# Patient Record
Sex: MALE | Race: WHITE | Employment: OTHER | URBAN - METROPOLITAN AREA
[De-identification: names, ages, dates, MRNs, and addresses within clinical notes are randomized per-mention and may not be internally consistent; named-entity substitution may affect disease eponyms.]

---

## 2017-04-19 RX ORDER — GABAPENTIN 300 MG/1
CAPSULE ORAL
Qty: 270 CAPSULE | Refills: 0 | Status: SHIPPED | OUTPATIENT
Start: 2017-04-19 | End: 2017-11-08

## 2017-05-10 ENCOUNTER — MED REC SCAN ONLY (OUTPATIENT)
Dept: FAMILY MEDICINE CLINIC | Facility: CLINIC | Age: 67
End: 2017-05-10

## 2017-06-14 PROBLEM — E11.69 HYPERLIPIDEMIA ASSOCIATED WITH TYPE 2 DIABETES MELLITUS (HCC): Status: ACTIVE | Noted: 2017-06-14

## 2017-06-14 PROBLEM — E11.69 HYPERLIPIDEMIA ASSOCIATED WITH TYPE 2 DIABETES MELLITUS: Status: ACTIVE | Noted: 2017-06-14

## 2017-06-14 PROBLEM — E78.5 HYPERLIPIDEMIA ASSOCIATED WITH TYPE 2 DIABETES MELLITUS: Status: ACTIVE | Noted: 2017-06-14

## 2017-06-14 PROBLEM — E78.5 HYPERLIPIDEMIA ASSOCIATED WITH TYPE 2 DIABETES MELLITUS (HCC): Status: ACTIVE | Noted: 2017-06-14

## 2017-06-15 ENCOUNTER — OFFICE VISIT (OUTPATIENT)
Dept: FAMILY MEDICINE CLINIC | Facility: CLINIC | Age: 67
End: 2017-06-15

## 2017-06-15 ENCOUNTER — APPOINTMENT (OUTPATIENT)
Dept: LAB | Age: 67
End: 2017-06-15
Attending: FAMILY MEDICINE
Payer: MEDICARE

## 2017-06-15 ENCOUNTER — MED REC SCAN ONLY (OUTPATIENT)
Dept: FAMILY MEDICINE CLINIC | Facility: CLINIC | Age: 67
End: 2017-06-15

## 2017-06-15 VITALS
RESPIRATION RATE: 20 BRPM | HEIGHT: 67 IN | BODY MASS INDEX: 35.47 KG/M2 | TEMPERATURE: 99 F | DIASTOLIC BLOOD PRESSURE: 74 MMHG | HEART RATE: 80 BPM | SYSTOLIC BLOOD PRESSURE: 120 MMHG | OXYGEN SATURATION: 99 % | WEIGHT: 226 LBS

## 2017-06-15 DIAGNOSIS — Z00.00 MEDICARE ANNUAL WELLNESS VISIT, SUBSEQUENT: Primary | ICD-10-CM

## 2017-06-15 DIAGNOSIS — E11.43 WELL CONTROLLED TYPE 2 DIABETES MELLITUS WITH AUTONOMIC NEUROPATHY (HCC): ICD-10-CM

## 2017-06-15 DIAGNOSIS — Z87.39 HISTORY OF GOUT: ICD-10-CM

## 2017-06-15 DIAGNOSIS — E11.69 HYPERLIPIDEMIA ASSOCIATED WITH TYPE 2 DIABETES MELLITUS (HCC): ICD-10-CM

## 2017-06-15 DIAGNOSIS — N40.0 BENIGN PROSTATIC HYPERPLASIA WITHOUT LOWER URINARY TRACT SYMPTOMS, UNSPECIFIED MORPHOLOGY: ICD-10-CM

## 2017-06-15 DIAGNOSIS — I10 BENIGN HYPERTENSION: ICD-10-CM

## 2017-06-15 DIAGNOSIS — E78.5 HYPERLIPIDEMIA ASSOCIATED WITH TYPE 2 DIABETES MELLITUS (HCC): ICD-10-CM

## 2017-06-15 DIAGNOSIS — E66.01 SEVERE OBESITY (BMI 35.0-39.9): ICD-10-CM

## 2017-06-15 DIAGNOSIS — E11.42 DIABETIC POLYNEUROPATHY ASSOCIATED WITH TYPE 2 DIABETES MELLITUS (HCC): ICD-10-CM

## 2017-06-15 PROCEDURE — 80053 COMPREHEN METABOLIC PANEL: CPT

## 2017-06-15 PROCEDURE — 36415 COLL VENOUS BLD VENIPUNCTURE: CPT

## 2017-06-15 PROCEDURE — 82043 UR ALBUMIN QUANTITATIVE: CPT

## 2017-06-15 PROCEDURE — G0439 PPPS, SUBSEQ VISIT: HCPCS | Performed by: FAMILY MEDICINE

## 2017-06-15 PROCEDURE — 83036 HEMOGLOBIN GLYCOSYLATED A1C: CPT

## 2017-06-15 PROCEDURE — 80061 LIPID PANEL: CPT

## 2017-06-15 PROCEDURE — 84153 ASSAY OF PSA TOTAL: CPT

## 2017-06-15 PROCEDURE — 82570 ASSAY OF URINE CREATININE: CPT

## 2017-06-15 NOTE — PROGRESS NOTES
Kristina Hunt is a 78year old male who presents for a Medicare Annual Wellness visit.     HPI:     Patient Care Team: Patient Care Team:  Kolton Taylor MD as PCP - General (Family Practice)  Brittani Yip MD as Consulting Physician [facility] 06/10/2016   LDL 91 08/28/2015   LDL 93 11/01/2014       Lab Results  Component Value Date   AST 14* 06/10/2016   AST 18 08/28/2015   AST 14* 11/01/2014       Lab Results  Component Value Date   ALT 22 06/10/2016   ALT 34 08/28/2015   ALT 29 11/01/2014 or more medical conditions?: 1-Yes    Have you fallen in the last 12 months?: 0-No    Do you accidently lose urine?: 0-No    Do you have difficulty seeing?: 0-No    Do you have any difficulty walking or getting up?: 0-No    Do you have any tripping hazards previous visit. No flowsheet data found.      Fecal Occult Blood Annually OCCULT BLOOD RESULT (no units)   Date Value   06/17/2016 Negative for Occult Blood   06/17/2016 Negative for Occult Blood   06/17/2016 Negative for Occult Blood    No flowsheet data f Lisinopril              Coughing    CURRENT MEDICATIONS:     Current Outpatient Prescriptions:  Glucose Blood (ED CONTOUR TEST) In Vitro Strip As directed, once a day Disp: 100 strip Rfl: 4   GABAPENTIN 300 MG Oral Cap TAKE ONE CAPSULE BY MOUTH THRE incontinence  MUSCULOSKELETAL: denies back pain  NEURO: denies headaches  PSYCHE: denies depression or anxiety  HEMATOLOGIC: denies hx of anemia  ENDOCRINE: denies thyroid history  ALL/ASTHMA: denies hx of allergy or asthma    EXAM:   /74 mmHg  Pulse Strip 100 strip 4      Sig: As directed, once a day           Referral to Dr. Fuad Vance. Recommendations are: continue present meds, diabetic diet and exercise 3 times per week , check feet weekly recommended to the patient.   The patient indicates underst

## 2017-06-15 NOTE — PATIENT INSTRUCTIONS
Prevention Guidelines, Men Ages 72 and Older  Screening tests and vaccines are an important part of managing your health. Health counseling is essential, too. Below are guidelines for these, for men ages 72 and older.  Talk with your healthcare provider t Prostate cancer All men in this age group, talk to healthcare provider about risks and benefits of digital rectal exam (MAAME) and prostate-specific antigen (PSA) screening1 At routine exams   Syphilis Men at increased risk for infection – talk with your hea Fall prevention (exercise, vitamin D supplements) All men in this age group At routine exams   Sexually transmitted infection Men at increased risk for infection – talk with your healthcare provider At routine exams   Use of daily aspirin Men ages 39 to 78 Step 1. Relax    · Take your blood pressure at the same time every day, such as in the morning or evening, or at the time your healthcare provider recommends.   · Wait at least a half-hour after smoking, eating, drinking caffeinated beverages, or exercising © 4883-0266 The [address] [place] [address], [address] [place]. All rights reserved. This information is not intended as a substitute for professional medical care. Always follow your healthcare professional's instructions.         Hypogly · If your blood sugar is still too low, repeat the steps above and check your blood sugar again. If your blood sugar still has not returned to your target range, contact your healthcare provider or seek emergency care.   · Once your blood sugar returns to t © 4236-0178 [address], [address] [place]. All rights reserved. This information is not intended as a substitute for professional medical care. Always follow your healthcare professional's instructions.         Exercis A pedometer is a small device that keeps track of how many steps you take. You can clip it to your belt (or a strap on your arm or leg) and go about your daily routine.  \"Smartphones\" now also have apps to record your walking. At the end of the day, the amanda Physical activity is important when you have diabetes. But you need to keep an eye on your blood sugar level. Check often if you have been active for longer than usual, or if the activity was unplanned.  Make it a habit to check your blood sugar before bein · HDL (high-density lipoprotein) is known as \"good cholesterol. \" This protein shell collects excess cholesterol that LDLs have left behind on blood vessel walls.  That's why high levels of HDL cholesterol can decrease your risk of heart disease and stroke · Take medication as directed. Many people need medication to get their LDL levels to a safe level. Medication to lower cholesterol levels is effective and safe.  (But taking medication is not a substitute for exercise or watching your diet!) Your doctor ca

## 2017-06-16 ENCOUNTER — TELEPHONE (OUTPATIENT)
Dept: FAMILY MEDICINE CLINIC | Facility: CLINIC | Age: 67
End: 2017-06-16

## 2017-06-19 ENCOUNTER — TELEPHONE (OUTPATIENT)
Dept: FAMILY MEDICINE CLINIC | Facility: CLINIC | Age: 67
End: 2017-06-19

## 2017-06-19 NOTE — TELEPHONE ENCOUNTER
----- Message from Yoseph Harris MD sent at 6/16/2017  9:27 AM CDT -----  Glucose much better, PSA is going up since last year, ok to see urologist.      Dr. Trae Gong    Urology      [address] [facility] [address] [facility], Counts include 234 beds at the [facility] [address]    Phone: [phone]

## 2017-06-19 NOTE — TELEPHONE ENCOUNTER
I called pt at [phone]  and verified 2 patient identifiers: name & . I discussed results and recommendations at length with patient. Dr Linda Tsang given. All questions answered.

## 2017-06-19 NOTE — TELEPHONE ENCOUNTER
Called spouse back, [place] per LAURA, She is aware the pt can see the alternative provider this Thursday.

## 2017-07-05 ENCOUNTER — TELEPHONE (OUTPATIENT)
Dept: FAMILY MEDICINE CLINIC | Facility: CLINIC | Age: 67
End: 2017-07-05

## 2017-10-10 DIAGNOSIS — I10 BENIGN HYPERTENSION: ICD-10-CM

## 2017-10-10 RX ORDER — LOSARTAN POTASSIUM 100 MG/1
TABLET ORAL
Qty: 90 TABLET | Refills: 3 | Status: SHIPPED | OUTPATIENT
Start: 2017-10-10 | End: 2018-06-11

## 2017-10-10 RX ORDER — GABAPENTIN 300 MG/1
CAPSULE ORAL
Qty: 270 CAPSULE | Refills: 3 | Status: SHIPPED | OUTPATIENT
Start: 2017-10-10 | End: 2017-11-08 | Stop reason: ALTCHOICE

## 2017-10-10 RX ORDER — ALLOPURINOL 300 MG/1
TABLET ORAL
Qty: 90 TABLET | Refills: 3 | Status: SHIPPED | OUTPATIENT
Start: 2017-10-10 | End: 2018-06-11

## 2017-10-10 NOTE — TELEPHONE ENCOUNTER
LOV:06/15/17   LF:11/03/16        Pending Prescriptions Disp Refills    ALLOPURINOL 300 MG Oral Tab [Pharmacy Med Name: ALLOPURINOL 300MG   TAB] 90 tablet 3     Sig: TAKE ONE TABLET BY MOUTH ONCE DAILY             Please approve or deny Rx. Thank you.

## 2017-10-19 ENCOUNTER — MED REC SCAN ONLY (OUTPATIENT)
Dept: FAMILY MEDICINE CLINIC | Facility: CLINIC | Age: 67
End: 2017-10-19

## 2017-11-08 ENCOUNTER — TELEPHONE (OUTPATIENT)
Dept: FAMILY MEDICINE CLINIC | Facility: CLINIC | Age: 67
End: 2017-11-08

## 2017-11-08 ENCOUNTER — OFFICE VISIT (OUTPATIENT)
Dept: FAMILY MEDICINE CLINIC | Facility: CLINIC | Age: 67
End: 2017-11-08

## 2017-11-08 VITALS
WEIGHT: 240 LBS | HEIGHT: 67 IN | SYSTOLIC BLOOD PRESSURE: 146 MMHG | BODY MASS INDEX: 37.67 KG/M2 | TEMPERATURE: 99 F | DIASTOLIC BLOOD PRESSURE: 98 MMHG | OXYGEN SATURATION: 95 % | RESPIRATION RATE: 18 BRPM | HEART RATE: 73 BPM

## 2017-11-08 DIAGNOSIS — E11.43 WELL CONTROLLED TYPE 2 DIABETES MELLITUS WITH AUTONOMIC NEUROPATHY (HCC): Primary | ICD-10-CM

## 2017-11-08 DIAGNOSIS — E78.5 HYPERLIPIDEMIA ASSOCIATED WITH TYPE 2 DIABETES MELLITUS (HCC): ICD-10-CM

## 2017-11-08 DIAGNOSIS — E11.69 HYPERLIPIDEMIA ASSOCIATED WITH TYPE 2 DIABETES MELLITUS (HCC): ICD-10-CM

## 2017-11-08 DIAGNOSIS — I10 BENIGN HYPERTENSION: ICD-10-CM

## 2017-11-08 PROCEDURE — 99214 OFFICE O/P EST MOD 30 MIN: CPT | Performed by: FAMILY MEDICINE

## 2017-11-08 RX ORDER — AZITHROMYCIN 250 MG/1
TABLET, FILM COATED ORAL
Qty: 6 TABLET | Refills: 0 | Status: SHIPPED | OUTPATIENT
Start: 2017-11-08 | End: 2018-06-11 | Stop reason: ALTCHOICE

## 2017-11-08 RX ORDER — GABAPENTIN 300 MG/1
CAPSULE ORAL
Qty: 270 CAPSULE | Refills: 3 | Status: SHIPPED | OUTPATIENT
Start: 2017-11-08 | End: 2018-06-11

## 2017-11-08 NOTE — TELEPHONE ENCOUNTER
----- Message from Meryl Ortiz MD sent at 11/8/2017  9:53 AM CST -----  Please update pt colonoscopy with fit test.

## 2017-11-08 NOTE — PROGRESS NOTES
Patient presents with: Follow - Up    DM, HL, HTN f/u. HPI:  Isabella Adame is a 78year old male who presents for recheck of his diabetes. Patient’s FBS have been below 100. Last HgbA1c was 5.9. Last visit with ophthalmologist was last year.  Last mi ALLOPURINOL 300 MG Oral Tab TAKE ONE TABLET BY MOUTH ONCE DAILY Disp: 90 tablet Rfl: 3   Glucose Blood (ED CONTOUR NEXT TEST) In Vitro Strip Use once daily to check blood sugar.  Dx: E11.65 Disp: 100 each Rfl: 4   Blood Glucose Monitoring Suppl (ED 2+ marky. Skin: is unremarkable with no rashes. Neuro: no focal abnormalities, and reflexes coordination and gait normal and symmetric.  Bilateral barefoot skin diabetic exam is normal, visualized feet and the appearance is normal.  Bilateral monofilament

## 2018-03-08 ENCOUNTER — PATIENT OUTREACH (OUTPATIENT)
Dept: FAMILY MEDICINE CLINIC | Facility: CLINIC | Age: 68
End: 2018-03-08

## 2018-03-08 DIAGNOSIS — E11.42 DIABETIC POLYNEUROPATHY ASSOCIATED WITH TYPE 2 DIABETES MELLITUS (HCC): Primary | ICD-10-CM

## 2018-06-11 ENCOUNTER — OFFICE VISIT (OUTPATIENT)
Dept: FAMILY MEDICINE CLINIC | Facility: CLINIC | Age: 68
End: 2018-06-11

## 2018-06-11 ENCOUNTER — APPOINTMENT (OUTPATIENT)
Dept: LAB | Age: 68
End: 2018-06-11
Attending: FAMILY MEDICINE
Payer: MEDICARE

## 2018-06-11 VITALS
DIASTOLIC BLOOD PRESSURE: 80 MMHG | TEMPERATURE: 99 F | WEIGHT: 227 LBS | HEART RATE: 82 BPM | RESPIRATION RATE: 20 BRPM | HEIGHT: 67 IN | SYSTOLIC BLOOD PRESSURE: 122 MMHG | OXYGEN SATURATION: 99 % | BODY MASS INDEX: 35.63 KG/M2

## 2018-06-11 DIAGNOSIS — I10 BENIGN HYPERTENSION: ICD-10-CM

## 2018-06-11 DIAGNOSIS — N40.0 BENIGN PROSTATIC HYPERPLASIA WITHOUT LOWER URINARY TRACT SYMPTOMS: ICD-10-CM

## 2018-06-11 DIAGNOSIS — E66.01 SEVERE OBESITY (BMI 35.0-39.9): ICD-10-CM

## 2018-06-11 DIAGNOSIS — Z87.39 HISTORY OF GOUT: ICD-10-CM

## 2018-06-11 DIAGNOSIS — Z00.00 MEDICARE ANNUAL WELLNESS VISIT, SUBSEQUENT: Primary | ICD-10-CM

## 2018-06-11 DIAGNOSIS — E11.42 DIABETIC POLYNEUROPATHY ASSOCIATED WITH TYPE 2 DIABETES MELLITUS (HCC): ICD-10-CM

## 2018-06-11 DIAGNOSIS — G62.9 NEUROPATHY: ICD-10-CM

## 2018-06-11 DIAGNOSIS — R73.03 PREDIABETES: ICD-10-CM

## 2018-06-11 PROBLEM — E78.5 HYPERLIPIDEMIA ASSOCIATED WITH TYPE 2 DIABETES MELLITUS: Status: RESOLVED | Noted: 2017-06-14 | Resolved: 2018-06-11

## 2018-06-11 PROBLEM — E78.5 HYPERLIPIDEMIA ASSOCIATED WITH TYPE 2 DIABETES MELLITUS (HCC): Status: RESOLVED | Noted: 2017-06-14 | Resolved: 2018-06-11

## 2018-06-11 PROBLEM — E11.69 HYPERLIPIDEMIA ASSOCIATED WITH TYPE 2 DIABETES MELLITUS: Status: RESOLVED | Noted: 2017-06-14 | Resolved: 2018-06-11

## 2018-06-11 PROBLEM — E11.43: Status: RESOLVED | Noted: 2017-06-15 | Resolved: 2018-06-11

## 2018-06-11 PROBLEM — E11.69 HYPERLIPIDEMIA ASSOCIATED WITH TYPE 2 DIABETES MELLITUS (HCC): Status: RESOLVED | Noted: 2017-06-14 | Resolved: 2018-06-11

## 2018-06-11 PROCEDURE — 82043 UR ALBUMIN QUANTITATIVE: CPT

## 2018-06-11 PROCEDURE — 83036 HEMOGLOBIN GLYCOSYLATED A1C: CPT

## 2018-06-11 PROCEDURE — 80061 LIPID PANEL: CPT

## 2018-06-11 PROCEDURE — 80053 COMPREHEN METABOLIC PANEL: CPT

## 2018-06-11 PROCEDURE — G0439 PPPS, SUBSEQ VISIT: HCPCS | Performed by: FAMILY MEDICINE

## 2018-06-11 PROCEDURE — 36415 COLL VENOUS BLD VENIPUNCTURE: CPT

## 2018-06-11 PROCEDURE — 84153 ASSAY OF PSA TOTAL: CPT

## 2018-06-11 PROCEDURE — 82570 ASSAY OF URINE CREATININE: CPT

## 2018-06-11 RX ORDER — LOSARTAN POTASSIUM 100 MG/1
TABLET ORAL
Qty: 90 TABLET | Refills: 4 | Status: SHIPPED | OUTPATIENT
Start: 2018-06-11 | End: 2018-11-12

## 2018-06-11 RX ORDER — ALLOPURINOL 300 MG/1
TABLET ORAL
Qty: 90 TABLET | Refills: 4 | Status: SHIPPED | OUTPATIENT
Start: 2018-06-11 | End: 2018-11-12

## 2018-06-11 RX ORDER — GABAPENTIN 600 MG/1
600 TABLET ORAL 3 TIMES DAILY
Qty: 270 TABLET | Refills: 4 | Status: SHIPPED | OUTPATIENT
Start: 2018-06-11 | End: 2018-11-12

## 2018-06-11 NOTE — PROGRESS NOTES
Shelbie Portillo is a 75year old male who presents for a Medicare Annual Wellness visit.     HPI:     Patient Care Team: Patient Care Team:  Jolly Grimaldo MD as PCP - General (Family Practice)  Bijan Priest MD as Consulting Physician [facility] Lab Results  Component Value Date   TSH 1.560 11/01/2014       Lab Results  Component Value Date   BUN 18 06/15/2017   BUN 18 06/10/2016   BUN 17 08/28/2015   CREATSERUM 1.22 06/15/2017   CREATSERUM 1.16 06/10/2016   CREATSERUM 1.09 08/28/2015 (over the last two weeks)?: Not at all    Feeling down, depressed, or hopeless (over the last two weeks)?: Not at all    PHQ-2 SCORE: 0        Advance Directives     Do you have a healthcare power of ?: No    Do you have a living will?: No     Plea orders found for this or any previous visit. Update Immunization Activity if applicable    Hepatitis B No orders found for this or any previous visit. Update Immunization Activity if applicable    Tetanus No orders found for this or any previous visit.  Upd MONITOR) W/DEVICE Does not apply Kit 1 Application by Does not apply route daily.  Disp: 1 kit Rfl: 0      MEDICAL INFORMATION:   Past Medical History:   Diagnosis Date   • Essential hypertension, malignant 3/29/12   • Other and unspecified hyperlipidemia adenopathy, no bruits  CHEST: no chest tenderness  BREAST: no masses or discharge  LUNGS: clear to auscultation  CARDIO: RRR without murmur  GI: good BS's, no masses, HSM or tenderness  : two descended testicles, no masses, no hernia and no penile lesion counseling performed    SUGGESTED VACCINATIONS -pt refuses: Pneumococcal, Zoster, Tetanus     Immunization History   Administered Date(s) Administered   • Fluzone Vaccine Medicare () 11/03/2014   • Influenza 10/07/2015, 10/17/2017   • Influenza Vaccin

## 2018-06-19 ENCOUNTER — TELEPHONE (OUTPATIENT)
Dept: FAMILY MEDICINE CLINIC | Facility: CLINIC | Age: 68
End: 2018-06-19

## 2018-11-12 ENCOUNTER — OFFICE VISIT (OUTPATIENT)
Dept: FAMILY MEDICINE CLINIC | Facility: CLINIC | Age: 68
End: 2018-11-12
Payer: MEDICARE

## 2018-11-12 VITALS
HEART RATE: 76 BPM | OXYGEN SATURATION: 99 % | DIASTOLIC BLOOD PRESSURE: 80 MMHG | WEIGHT: 239 LBS | BODY MASS INDEX: 37.51 KG/M2 | HEIGHT: 67 IN | SYSTOLIC BLOOD PRESSURE: 124 MMHG | RESPIRATION RATE: 20 BRPM | TEMPERATURE: 99 F

## 2018-11-12 DIAGNOSIS — R73.03 PREDIABETES: Primary | ICD-10-CM

## 2018-11-12 DIAGNOSIS — I10 BENIGN HYPERTENSION: ICD-10-CM

## 2018-11-12 DIAGNOSIS — E78.1 HYPERTRIGLYCERIDEMIA: ICD-10-CM

## 2018-11-12 PROCEDURE — 99214 OFFICE O/P EST MOD 30 MIN: CPT | Performed by: FAMILY MEDICINE

## 2018-11-12 RX ORDER — GABAPENTIN 600 MG/1
600 TABLET ORAL 3 TIMES DAILY
Qty: 270 TABLET | Refills: 4 | Status: SHIPPED | OUTPATIENT
Start: 2018-11-12 | End: 2019-11-04

## 2018-11-12 RX ORDER — CIPROFLOXACIN AND DEXAMETHASONE 3; 1 MG/ML; MG/ML
4 SUSPENSION/ DROPS AURICULAR (OTIC) 2 TIMES DAILY
Qty: 7.5 ML | Refills: 1 | Status: SHIPPED | OUTPATIENT
Start: 2018-11-12 | End: 2018-11-13

## 2018-11-12 RX ORDER — AZITHROMYCIN 250 MG/1
TABLET, FILM COATED ORAL
Qty: 6 TABLET | Refills: 1 | Status: SHIPPED | OUTPATIENT
Start: 2018-11-12 | End: 2019-06-03 | Stop reason: ALTCHOICE

## 2018-11-12 RX ORDER — ALLOPURINOL 300 MG/1
TABLET ORAL
Qty: 90 TABLET | Refills: 4 | Status: SHIPPED | OUTPATIENT
Start: 2018-11-12 | End: 2019-11-04

## 2018-11-12 RX ORDER — LOSARTAN POTASSIUM 100 MG/1
TABLET ORAL
Qty: 90 TABLET | Refills: 4 | Status: SHIPPED | OUTPATIENT
Start: 2018-11-12 | End: 2019-07-12 | Stop reason: ALTCHOICE

## 2018-11-13 ENCOUNTER — TELEPHONE (OUTPATIENT)
Dept: FAMILY MEDICINE CLINIC | Facility: CLINIC | Age: 68
End: 2018-11-13

## 2018-11-13 RX ORDER — CIPROFLOXACIN AND DEXAMETHASONE 3; 1 MG/ML; MG/ML
4 SUSPENSION/ DROPS AURICULAR (OTIC) 2 TIMES DAILY
Qty: 7.5 ML | Refills: 1 | Status: SHIPPED | OUTPATIENT
Start: 2018-11-13 | End: 2019-06-03 | Stop reason: ALTCHOICE

## 2018-11-13 NOTE — TELEPHONE ENCOUNTER
[facility] called to clarify which directions you want for pt's Ciprodex Rx, 2 different directions were on Rx:  \"Place 4 drops into both ears 2 (two) times daily. Two drops BID to affected ear. \"

## 2018-11-13 NOTE — TELEPHONE ENCOUNTER
[address] calling to inquire which set of directions they should follow, 2 sets were sent for script for ciprofloxacin-dexamethasone (CIPRODEX) 0.3-0.1 % Otic Suspension   Please call to advise

## 2018-11-21 ENCOUNTER — TELEPHONE (OUTPATIENT)
Dept: FAMILY MEDICINE CLINIC | Facility: CLINIC | Age: 68
End: 2018-11-21

## 2018-11-21 NOTE — TELEPHONE ENCOUNTER
PEDROI from pt below. Pls advise. Hx: prediabetes. Notes recorded by Miguel Sanders MD on 6/12/2018 at 4:04 PM CDT  Good results, no DM.

## 2018-11-21 NOTE — TELEPHONE ENCOUNTER
Called the patient, spoke with Cici [place], wife (on HIPAA), the patient had his eye exam last year, he was under the impression that he was off of the diabetic list. Pt's insurance does not cover eye exams and the Pt does not want to have an eye exam at this mo

## 2019-05-10 ENCOUNTER — TELEPHONE (OUTPATIENT)
Dept: FAMILY MEDICINE CLINIC | Facility: CLINIC | Age: 69
End: 2019-05-10

## 2019-05-10 NOTE — TELEPHONE ENCOUNTER
----- Message from Meryl Ortiz MD sent at 5/7/2019  4:01 PM CDT -----  Colonoscopy its up to date, we need to remove in  the fit test. He shows up in my healthy planet as colonoscopy not done.

## 2019-06-03 ENCOUNTER — OFFICE VISIT (OUTPATIENT)
Dept: FAMILY MEDICINE CLINIC | Facility: CLINIC | Age: 69
End: 2019-06-03
Payer: MEDICARE

## 2019-06-03 ENCOUNTER — LAB ENCOUNTER (OUTPATIENT)
Dept: LAB | Age: 69
End: 2019-06-03
Attending: FAMILY MEDICINE
Payer: MEDICARE

## 2019-06-03 VITALS
BODY MASS INDEX: 33.43 KG/M2 | RESPIRATION RATE: 22 BRPM | SYSTOLIC BLOOD PRESSURE: 126 MMHG | TEMPERATURE: 99 F | WEIGHT: 213 LBS | HEIGHT: 67 IN | HEART RATE: 78 BPM | OXYGEN SATURATION: 99 % | DIASTOLIC BLOOD PRESSURE: 80 MMHG

## 2019-06-03 DIAGNOSIS — N40.0 BENIGN PROSTATIC HYPERPLASIA WITHOUT LOWER URINARY TRACT SYMPTOMS: ICD-10-CM

## 2019-06-03 DIAGNOSIS — Z00.00 MEDICARE ANNUAL WELLNESS VISIT, SUBSEQUENT: Primary | ICD-10-CM

## 2019-06-03 DIAGNOSIS — R73.09 ELEVATED GLUCOSE: ICD-10-CM

## 2019-06-03 DIAGNOSIS — I10 BENIGN HYPERTENSION: ICD-10-CM

## 2019-06-03 DIAGNOSIS — Z12.5 SCREENING FOR PROSTATE CANCER: ICD-10-CM

## 2019-06-03 DIAGNOSIS — E78.1 HYPERTRIGLYCERIDEMIA: ICD-10-CM

## 2019-06-03 PROCEDURE — 80053 COMPREHEN METABOLIC PANEL: CPT

## 2019-06-03 PROCEDURE — 80061 LIPID PANEL: CPT

## 2019-06-03 PROCEDURE — G0444 DEPRESSION SCREEN ANNUAL: HCPCS | Performed by: FAMILY MEDICINE

## 2019-06-03 PROCEDURE — 85025 COMPLETE CBC W/AUTO DIFF WBC: CPT

## 2019-06-03 PROCEDURE — 84443 ASSAY THYROID STIM HORMONE: CPT

## 2019-06-03 PROCEDURE — 36415 COLL VENOUS BLD VENIPUNCTURE: CPT

## 2019-06-03 PROCEDURE — 83036 HEMOGLOBIN GLYCOSYLATED A1C: CPT

## 2019-06-03 PROCEDURE — G0439 PPPS, SUBSEQ VISIT: HCPCS | Performed by: FAMILY MEDICINE

## 2019-06-03 NOTE — PROGRESS NOTES
Aby Franz is a 70year old male who presents for a Medicare Annual Wellness visit.     HPI:     Patient Care Team: Patient Care Team:  Leo Pastrana MD as PCP - General (Family Practice)  Leo Pastrana MD as PCP - Sybil Wick MD ALT 29 06/11/2018    ALT 29 06/15/2017    ALT 22 06/10/2016     Lab Results   Component Value Date    TSH 1.560 11/01/2014     Lab Results   Component Value Date    BUN 23 (H) 06/11/2018    BUN 18 06/15/2017    BUN 18 06/10/2016    CREATSERUM 1.22 06/11 WEEKS   Little interest or pleasure in doing things (over the last two weeks)?: Not at all    Feeling down, depressed, or hopeless (over the last two weeks)?: Not at all    PHQ-2 SCORE: 0        Advance Directives     Do you have a healthcare power of atto applicable    Pneumococcal No orders found for this or any previous visit. Update Immunization Activity if applicable    Hepatitis B No orders found for this or any previous visit.  Update Immunization Activity if applicable    Tetanus No orders found for t ([employer] CONTOUR NEXT USB MONITOR) W/DEVICE Does not apply Kit 1 Application by Does not apply route daily.  Disp: 1 kit Rfl: 0      MEDICAL INFORMATION:   Past Medical History:   Diagnosis Date   • Essential hypertension, malignant 3/29/12   • Other and unsp adenopathy, no bruits  CHEST: no chest tenderness  BREAST: no masses or discharge  LUNGS: clear to auscultation  CARDIO: RRR without murmur  GI: good BS's, no masses, HSM or tenderness  : two descended testicles, no masses, no hernia and no penile lesion performed    SUGGESTED VACCINATIONS -pt refuses: Pneumococcal, Zoster, Tetanus     Immunization History   Administered Date(s) Administered   • FLU VACC High Dose 65 YRS & Older PRSV Free (02494) 10/26/2016, 10/12/2018   • Fluzone Vaccine Medicare ()

## 2019-06-03 NOTE — PATIENT INSTRUCTIONS
Low-Salt Choices  Eating salt (sodium) can make your body retain too much water. Excess water makes your heart work harder. Canned, packaged, and frozen foods are easy to prepare. But they are often high in sodium.  Here are some ideas for low-salt foods Screening tests and vaccines are an important part of managing your health. A screening test is done to find possible disorders or diseases in people who don't have any symptoms.  The goal is to find a disease early so lifestyle changes can be made and you c Hepatitis C Men at increased risk for infection – talk with your healthcare provider At routine exams   High cholesterol or triglycerides All men in this age group At least every 5 years   HIV Men at increased risk for infection – talk with your healthcare Pneumococcal conjugate vaccine (PCV13) and pneumococcal polysaccharide vaccine (PPSV23) All men in this age group 1 dose of each vaccine   Tetanus/diphtheria/  pertussis (Td/Tdap) booster All men in this age group Td every 10 years, or Tdap if you will hav

## 2019-07-08 ENCOUNTER — TELEPHONE (OUTPATIENT)
Dept: FAMILY MEDICINE CLINIC | Facility: CLINIC | Age: 69
End: 2019-07-08

## 2019-07-08 ENCOUNTER — LAB ENCOUNTER (OUTPATIENT)
Dept: LAB | Age: 69
End: 2019-07-08
Attending: NURSE PRACTITIONER
Payer: MEDICARE

## 2019-07-08 ENCOUNTER — OFFICE VISIT (OUTPATIENT)
Dept: FAMILY MEDICINE CLINIC | Facility: CLINIC | Age: 69
End: 2019-07-08
Payer: MEDICARE

## 2019-07-08 VITALS
HEIGHT: 67 IN | BODY MASS INDEX: 35.79 KG/M2 | HEART RATE: 68 BPM | WEIGHT: 228 LBS | RESPIRATION RATE: 16 BRPM | SYSTOLIC BLOOD PRESSURE: 176 MMHG | OXYGEN SATURATION: 98 % | DIASTOLIC BLOOD PRESSURE: 90 MMHG

## 2019-07-08 DIAGNOSIS — I10 ESSENTIAL HYPERTENSION: Primary | ICD-10-CM

## 2019-07-08 DIAGNOSIS — R42 DIZZINESS AND GIDDINESS: ICD-10-CM

## 2019-07-08 DIAGNOSIS — I10 ESSENTIAL HYPERTENSION: ICD-10-CM

## 2019-07-08 LAB
ALBUMIN SERPL-MCNC: 4.2 G/DL (ref 3.4–5)
ALBUMIN/GLOB SERPL: 1.3 {RATIO} (ref 1–2)
ALP LIVER SERPL-CCNC: 74 U/L (ref 45–117)
ALT SERPL-CCNC: 28 U/L (ref 16–61)
ANION GAP SERPL CALC-SCNC: 7 MMOL/L (ref 0–18)
AST SERPL-CCNC: 18 U/L (ref 15–37)
BASOPHILS # BLD AUTO: 0.05 X10(3) UL (ref 0–0.2)
BASOPHILS NFR BLD AUTO: 0.5 %
BILIRUB SERPL-MCNC: 0.6 MG/DL (ref 0.1–2)
BUN BLD-MCNC: 19 MG/DL (ref 7–18)
BUN/CREAT SERPL: 15.4 (ref 10–20)
CALCIUM BLD-MCNC: 9.1 MG/DL (ref 8.5–10.1)
CHLORIDE SERPL-SCNC: 107 MMOL/L (ref 98–112)
CO2 SERPL-SCNC: 27 MMOL/L (ref 21–32)
CREAT BLD-MCNC: 1.23 MG/DL (ref 0.7–1.3)
DEPRECATED RDW RBC AUTO: 45.4 FL (ref 35.1–46.3)
EOSINOPHIL # BLD AUTO: 0.07 X10(3) UL (ref 0–0.7)
EOSINOPHIL NFR BLD AUTO: 0.8 %
ERYTHROCYTE [DISTWIDTH] IN BLOOD BY AUTOMATED COUNT: 13.2 % (ref 11–15)
GLOBULIN PLAS-MCNC: 3.3 G/DL (ref 2.8–4.4)
GLUCOSE BLD-MCNC: 106 MG/DL (ref 70–99)
GLUCOSE BLOOD: 109
HAV IGM SER QL: 2.3 MG/DL (ref 1.6–2.6)
HCT VFR BLD AUTO: 48.4 % (ref 39–53)
HGB BLD-MCNC: 15.8 G/DL (ref 13–17.5)
IMM GRANULOCYTES # BLD AUTO: 0.08 X10(3) UL (ref 0–1)
IMM GRANULOCYTES NFR BLD: 0.9 %
LYMPHOCYTES # BLD AUTO: 1.14 X10(3) UL (ref 1–4)
LYMPHOCYTES NFR BLD AUTO: 12.4 %
M PROTEIN MFR SERPL ELPH: 7.5 G/DL (ref 6.4–8.2)
MCH RBC QN AUTO: 30.3 PG (ref 26–34)
MCHC RBC AUTO-ENTMCNC: 32.6 G/DL (ref 31–37)
MCV RBC AUTO: 92.7 FL (ref 80–100)
MONOCYTES # BLD AUTO: 0.51 X10(3) UL (ref 0.1–1)
MONOCYTES NFR BLD AUTO: 5.5 %
NEUTROPHILS # BLD AUTO: 7.37 X10 (3) UL (ref 1.5–7.7)
NEUTROPHILS # BLD AUTO: 7.37 X10(3) UL (ref 1.5–7.7)
NEUTROPHILS NFR BLD AUTO: 79.9 %
OSMOLALITY SERPL CALC.SUM OF ELEC: 295 MOSM/KG (ref 275–295)
PLATELET # BLD AUTO: 330 10(3)UL (ref 150–450)
POTASSIUM SERPL-SCNC: 4.4 MMOL/L (ref 3.5–5.1)
RBC # BLD AUTO: 5.22 X10(6)UL (ref 3.8–5.8)
SODIUM SERPL-SCNC: 141 MMOL/L (ref 136–145)
TEST STRIP LOT #: NORMAL NUMERIC
WBC # BLD AUTO: 9.2 X10(3) UL (ref 4–11)

## 2019-07-08 PROCEDURE — 82962 GLUCOSE BLOOD TEST: CPT | Performed by: NURSE PRACTITIONER

## 2019-07-08 PROCEDURE — 93000 ELECTROCARDIOGRAM COMPLETE: CPT | Performed by: NURSE PRACTITIONER

## 2019-07-08 PROCEDURE — 80053 COMPREHEN METABOLIC PANEL: CPT

## 2019-07-08 PROCEDURE — 85025 COMPLETE CBC W/AUTO DIFF WBC: CPT

## 2019-07-08 PROCEDURE — 36415 COLL VENOUS BLD VENIPUNCTURE: CPT

## 2019-07-08 PROCEDURE — 99214 OFFICE O/P EST MOD 30 MIN: CPT | Performed by: NURSE PRACTITIONER

## 2019-07-08 PROCEDURE — 83735 ASSAY OF MAGNESIUM: CPT

## 2019-07-08 RX ORDER — AMLODIPINE BESYLATE 2.5 MG/1
2.5 TABLET ORAL DAILY
Qty: 30 TABLET | Refills: 0 | Status: SHIPPED | OUTPATIENT
Start: 2019-07-08 | End: 2019-07-12 | Stop reason: ALTCHOICE

## 2019-07-08 NOTE — PROGRESS NOTES
Patient presents with:  Blood Pressure: elevated blood pressure, shakey, fatigue  : The patient is 70 y male presents with high blood pressure and c/o of dizziness since Wednesday 7/3/19.  Reports prior he was all day working outside , did not had enough seizures, no confusion, no weakness, no abnormal sensation, no vertigo. GI: no nausea or vomiting. MUSC: no other joint pains.     BP (!) 172/90   Pulse 68   Resp 16   Ht 67\"   Wt 228 lb   SpO2 98%   BMI 35.71 kg/m²       PE:  General: WD/WN in no acute

## 2019-07-08 NOTE — PATIENT INSTRUCTIONS
Low-Salt Choices  Eating salt (sodium) can make your body retain too much water. Excess water makes your heart work harder. Canned, packaged, and frozen foods are easy to prepare. But they are often high in sodium.  Here are some ideas for low-salt foods Dizziness is a common symptom. It may be described as lightheadedness, spinning, or feeling like you are going to faint. Dizziness can have many causes.   Be sure to tell the healthcare provider about:  · All medicines you take, including prescription, over © 8115-5551 The [address]. [address] [facility], [facility] [place]. All rights reserved. This information is not intended as a substitute for professional medical care. Always follow your healthcare professional's instructions.

## 2019-07-08 NOTE — TELEPHONE ENCOUNTER
New or ongoing issue: New    Brief description of symptoms: high BP since Wednesday of last week, 170s/90s, low pulse, no energy, light headed, dizzy    Approximately how long? : since Wednesday    Offered appointment: Yes ( with NP since PCP is booked, Pt

## 2019-07-12 ENCOUNTER — OFFICE VISIT (OUTPATIENT)
Dept: FAMILY MEDICINE CLINIC | Facility: CLINIC | Age: 69
End: 2019-07-12
Payer: MEDICARE

## 2019-07-12 VITALS
DIASTOLIC BLOOD PRESSURE: 92 MMHG | OXYGEN SATURATION: 99 % | RESPIRATION RATE: 20 BRPM | TEMPERATURE: 99 F | WEIGHT: 221 LBS | HEIGHT: 67 IN | HEART RATE: 72 BPM | BODY MASS INDEX: 34.69 KG/M2 | SYSTOLIC BLOOD PRESSURE: 142 MMHG

## 2019-07-12 DIAGNOSIS — I10 HTN (HYPERTENSION), MALIGNANT: Primary | ICD-10-CM

## 2019-07-12 PROCEDURE — 99214 OFFICE O/P EST MOD 30 MIN: CPT | Performed by: FAMILY MEDICINE

## 2019-07-12 RX ORDER — AMLODIPINE AND VALSARTAN 5; 320 MG/1; MG/1
1 TABLET ORAL DAILY
Qty: 30 TABLET | Refills: 0 | Status: SHIPPED | OUTPATIENT
Start: 2019-07-12 | End: 2019-07-26

## 2019-07-12 NOTE — PATIENT INSTRUCTIONS
Low-Salt Choices  Eating salt (sodium) can make your body retain too much water. Excess water makes your heart work harder. Canned, packaged, and frozen foods are easy to prepare. But they are often high in sodium.  Here are some ideas for low-salt foods © 3895-6576 [employer] [address]. [address] [facility], [facility] [place]. All rights reserved. This information is not intended as a substitute for professional medical care. Always follow your healthcare professional's instructions.         Nohemy Pickard If you have high blood pressure, follow these home care guidelines to help lower your blood pressure. If you are taking medicines for high blood pressure, these methods may reduce or end your need for medicines in the future.   · Start a weight-loss program · If your provider prescribed medicines, take them exactly as directed. Missing doses may cause your blood pressure get out of control. · If you miss a dose or doses, check with your healthcare provider or pharmacist about what to do.   · Consider buying a You will need to see your healthcare provider regularly. This is to check your blood pressure and to make changes to your medicines. Make a follow-up appointment as directed. Bring the record of your home blood pressure readings to the appointment.   When t

## 2019-07-12 NOTE — PROGRESS NOTES
Lucia Kennedy is a 70year old male presents with HTN.     HPI:   Patient presents for high blood pressure issues. Pt has been on Losartan and Amlodipne, home BP monitoring in the range of  738  's systolic and  90 's diastolic.   No chest pains, syncope, mellitus without mention of complication, not stated as uncontrolled    • Unspecified essential hypertension       History reviewed. No pertinent surgical history.    Social History:    Social History    Tobacco Use      Smoking status: Never Smoker      Sm

## 2019-07-26 ENCOUNTER — OFFICE VISIT (OUTPATIENT)
Dept: FAMILY MEDICINE CLINIC | Facility: CLINIC | Age: 69
End: 2019-07-26
Payer: MEDICARE

## 2019-07-26 VITALS
RESPIRATION RATE: 20 BRPM | DIASTOLIC BLOOD PRESSURE: 84 MMHG | TEMPERATURE: 99 F | SYSTOLIC BLOOD PRESSURE: 130 MMHG | WEIGHT: 221 LBS | OXYGEN SATURATION: 99 % | HEART RATE: 82 BPM | HEIGHT: 67 IN | BODY MASS INDEX: 34.69 KG/M2

## 2019-07-26 DIAGNOSIS — I10 BENIGN HYPERTENSION: Primary | ICD-10-CM

## 2019-07-26 PROCEDURE — 99213 OFFICE O/P EST LOW 20 MIN: CPT | Performed by: FAMILY MEDICINE

## 2019-07-26 RX ORDER — AMLODIPINE AND VALSARTAN 5; 320 MG/1; MG/1
1 TABLET ORAL DAILY
Qty: 90 TABLET | Refills: 4 | Status: SHIPPED | OUTPATIENT
Start: 2019-07-26 | End: 2020-11-09

## 2019-07-26 NOTE — PROGRESS NOTES
Ellene Hamman is a 70year old male with HTN f/u. HPI:   Patient presents for recheck of his hypertension.  Pt has been taking medications as instructed, no medication side effects, home BP monitoring in the range of 763-192F systolic and 74-57'N diast Smokeless tobacco: Never Used    Alcohol use: No    Drug use: No        REVIEW OF SYSTEMS:   GENERAL HEALTH: feels well otherwise, no weight change  EYE: no abnormal vision.   RESPIRATORY: denies shortness of breath  CARDIOVASCULAR: denies chest pain, palpi

## 2019-11-04 ENCOUNTER — OFFICE VISIT (OUTPATIENT)
Dept: FAMILY MEDICINE CLINIC | Facility: CLINIC | Age: 69
End: 2019-11-04
Payer: MEDICARE

## 2019-11-04 VITALS
OXYGEN SATURATION: 99 % | RESPIRATION RATE: 20 BRPM | HEIGHT: 67 IN | HEART RATE: 80 BPM | DIASTOLIC BLOOD PRESSURE: 80 MMHG | TEMPERATURE: 99 F | SYSTOLIC BLOOD PRESSURE: 126 MMHG | BODY MASS INDEX: 37.35 KG/M2 | WEIGHT: 238 LBS

## 2019-11-04 DIAGNOSIS — G62.9 NEUROPATHY: ICD-10-CM

## 2019-11-04 DIAGNOSIS — I10 BENIGN HYPERTENSION: ICD-10-CM

## 2019-11-04 DIAGNOSIS — E78.1 HYPERTRIGLYCERIDEMIA: Primary | ICD-10-CM

## 2019-11-04 PROCEDURE — 99214 OFFICE O/P EST MOD 30 MIN: CPT | Performed by: FAMILY MEDICINE

## 2019-11-04 RX ORDER — GABAPENTIN 600 MG/1
600 TABLET ORAL 3 TIMES DAILY
Qty: 270 TABLET | Refills: 4 | Status: SHIPPED | OUTPATIENT
Start: 2019-11-04 | End: 2020-07-02 | Stop reason: CLARIF

## 2019-11-04 RX ORDER — ALLOPURINOL 300 MG/1
TABLET ORAL
Qty: 90 TABLET | Refills: 4 | Status: SHIPPED | OUTPATIENT
Start: 2019-11-04 | End: 2020-11-09

## 2019-11-04 RX ORDER — AZITHROMYCIN 250 MG/1
TABLET, FILM COATED ORAL
Qty: 6 TABLET | Refills: 0 | Status: SHIPPED | OUTPATIENT
Start: 2019-11-04 | End: 2020-07-02 | Stop reason: ALTCHOICE

## 2019-11-04 NOTE — PROGRESS NOTES
CC: HL,HTN, neuropathy       Luigi Park is a 70year old male who presents for recheck of hyperlipidemia. Patient reports taking medications as instructed, no medication side effects noted. Denies any generalized muscle aches.   Patient presents for rec Diagnosis Date   • Essential hypertension, malignant 3/29/12   • Other and unspecified hyperlipidemia    • Type II or unspecified type diabetes mellitus without mention of complication, not stated as uncontrolled    • Unspecified essential hypertension

## 2020-07-02 ENCOUNTER — LAB ENCOUNTER (OUTPATIENT)
Dept: LAB | Age: 70
End: 2020-07-02
Attending: FAMILY MEDICINE
Payer: MEDICARE

## 2020-07-02 ENCOUNTER — OFFICE VISIT (OUTPATIENT)
Dept: FAMILY MEDICINE CLINIC | Facility: CLINIC | Age: 70
End: 2020-07-02
Payer: MEDICARE

## 2020-07-02 VITALS
TEMPERATURE: 98 F | HEART RATE: 70 BPM | HEIGHT: 67 IN | RESPIRATION RATE: 20 BRPM | WEIGHT: 236 LBS | BODY MASS INDEX: 37.04 KG/M2 | OXYGEN SATURATION: 96 % | SYSTOLIC BLOOD PRESSURE: 132 MMHG | DIASTOLIC BLOOD PRESSURE: 80 MMHG

## 2020-07-02 DIAGNOSIS — R73.03 PREDIABETES: ICD-10-CM

## 2020-07-02 DIAGNOSIS — I10 BENIGN HYPERTENSION: ICD-10-CM

## 2020-07-02 DIAGNOSIS — E66.01 SEVERE OBESITY (HCC): ICD-10-CM

## 2020-07-02 DIAGNOSIS — E78.1 HYPERTRIGLYCERIDEMIA: ICD-10-CM

## 2020-07-02 DIAGNOSIS — G62.9 NEUROPATHY: ICD-10-CM

## 2020-07-02 DIAGNOSIS — Z87.39 HISTORY OF GOUT: ICD-10-CM

## 2020-07-02 DIAGNOSIS — N40.0 BENIGN PROSTATIC HYPERPLASIA WITHOUT LOWER URINARY TRACT SYMPTOMS: ICD-10-CM

## 2020-07-02 DIAGNOSIS — Z12.5 SCREENING FOR PROSTATE CANCER: ICD-10-CM

## 2020-07-02 DIAGNOSIS — Z00.00 MEDICARE ANNUAL WELLNESS VISIT, SUBSEQUENT: Primary | ICD-10-CM

## 2020-07-02 LAB
ALBUMIN SERPL-MCNC: 4.3 G/DL (ref 3.4–5)
ALBUMIN/GLOB SERPL: 1.1 {RATIO} (ref 1–2)
ALP LIVER SERPL-CCNC: 79 U/L (ref 45–117)
ALT SERPL-CCNC: 32 U/L (ref 16–61)
ANION GAP SERPL CALC-SCNC: 3 MMOL/L (ref 0–18)
AST SERPL-CCNC: 18 U/L (ref 15–37)
BASOPHILS # BLD AUTO: 0.09 X10(3) UL (ref 0–0.2)
BASOPHILS NFR BLD AUTO: 0.9 %
BILIRUB SERPL-MCNC: 0.5 MG/DL (ref 0.1–2)
BUN BLD-MCNC: 22 MG/DL (ref 7–18)
BUN/CREAT SERPL: 16.3 (ref 10–20)
CALCIUM BLD-MCNC: 9.6 MG/DL (ref 8.5–10.1)
CHLORIDE SERPL-SCNC: 106 MMOL/L (ref 98–112)
CHOLEST SMN-MCNC: 219 MG/DL (ref ?–200)
CO2 SERPL-SCNC: 29 MMOL/L (ref 21–32)
COMPLEXED PSA SERPL-MCNC: 4.27 NG/ML (ref ?–4)
CREAT BLD-MCNC: 1.35 MG/DL (ref 0.7–1.3)
DEPRECATED RDW RBC AUTO: 45.7 FL (ref 35.1–46.3)
EOSINOPHIL # BLD AUTO: 0.15 X10(3) UL (ref 0–0.7)
EOSINOPHIL NFR BLD AUTO: 1.5 %
ERYTHROCYTE [DISTWIDTH] IN BLOOD BY AUTOMATED COUNT: 13.5 % (ref 11–15)
GLOBULIN PLAS-MCNC: 3.8 G/DL (ref 2.8–4.4)
GLUCOSE BLD-MCNC: 119 MG/DL (ref 70–99)
HCT VFR BLD AUTO: 50.9 % (ref 39–53)
HDLC SERPL-MCNC: 36 MG/DL (ref 40–59)
HGB BLD-MCNC: 16.7 G/DL (ref 13–17.5)
IMM GRANULOCYTES # BLD AUTO: 0.09 X10(3) UL (ref 0–1)
IMM GRANULOCYTES NFR BLD: 0.9 %
LDLC SERPL CALC-MCNC: 121 MG/DL (ref ?–100)
LYMPHOCYTES # BLD AUTO: 1.45 X10(3) UL (ref 1–4)
LYMPHOCYTES NFR BLD AUTO: 14.1 %
M PROTEIN MFR SERPL ELPH: 8.1 G/DL (ref 6.4–8.2)
MCH RBC QN AUTO: 30.5 PG (ref 26–34)
MCHC RBC AUTO-ENTMCNC: 32.8 G/DL (ref 31–37)
MCV RBC AUTO: 92.9 FL (ref 80–100)
MONOCYTES # BLD AUTO: 0.67 X10(3) UL (ref 0.1–1)
MONOCYTES NFR BLD AUTO: 6.5 %
NEUTROPHILS # BLD AUTO: 7.8 X10 (3) UL (ref 1.5–7.7)
NEUTROPHILS # BLD AUTO: 7.8 X10(3) UL (ref 1.5–7.7)
NEUTROPHILS NFR BLD AUTO: 76.1 %
NONHDLC SERPL-MCNC: 183 MG/DL (ref ?–130)
OSMOLALITY SERPL CALC.SUM OF ELEC: 290 MOSM/KG (ref 275–295)
PATIENT FASTING Y/N/NP: YES
PATIENT FASTING Y/N/NP: YES
PLATELET # BLD AUTO: 305 10(3)UL (ref 150–450)
POTASSIUM SERPL-SCNC: 4.8 MMOL/L (ref 3.5–5.1)
RBC # BLD AUTO: 5.48 X10(6)UL (ref 3.8–5.8)
SODIUM SERPL-SCNC: 138 MMOL/L (ref 136–145)
TRIGL SERPL-MCNC: 308 MG/DL (ref 30–149)
TSI SER-ACNC: 0.99 MIU/ML (ref 0.36–3.74)
VIT B12 SERPL-MCNC: 378 PG/ML (ref 193–986)
VLDLC SERPL CALC-MCNC: 62 MG/DL (ref 0–30)
WBC # BLD AUTO: 10.3 X10(3) UL (ref 4–11)

## 2020-07-02 PROCEDURE — 80061 LIPID PANEL: CPT

## 2020-07-02 PROCEDURE — G0444 DEPRESSION SCREEN ANNUAL: HCPCS | Performed by: FAMILY MEDICINE

## 2020-07-02 PROCEDURE — 36415 COLL VENOUS BLD VENIPUNCTURE: CPT

## 2020-07-02 PROCEDURE — 82607 VITAMIN B-12: CPT

## 2020-07-02 PROCEDURE — 84443 ASSAY THYROID STIM HORMONE: CPT

## 2020-07-02 PROCEDURE — G0439 PPPS, SUBSEQ VISIT: HCPCS | Performed by: FAMILY MEDICINE

## 2020-07-02 PROCEDURE — 99213 OFFICE O/P EST LOW 20 MIN: CPT | Performed by: FAMILY MEDICINE

## 2020-07-02 PROCEDURE — 85025 COMPLETE CBC W/AUTO DIFF WBC: CPT

## 2020-07-02 PROCEDURE — 80053 COMPREHEN METABOLIC PANEL: CPT

## 2020-07-02 RX ORDER — PREGABALIN 50 MG/1
CAPSULE ORAL
Qty: 90 CAPSULE | Refills: 3 | Status: SHIPPED | OUTPATIENT
Start: 2020-07-02 | End: 2020-10-29

## 2020-07-02 NOTE — PROGRESS NOTES
Rupal Roth is a 78year old male who presents for a Medicare Annual Wellness visit and neuropathy.     HPI:     Patient Care Team: Patient Care Team:  Mara Anderson MD as PCP - General (Family Practice)  Mara Anderson MD as PCP - Scott Arenas Results   Component Value Date     (H) 06/03/2019     06/11/2018     06/15/2017     Lab Results   Component Value Date    AST 18 07/08/2019    AST 15 06/03/2019    AST 18 06/11/2018     Lab Results   Component Value Date    ALT 28 07/0 you fallen in the last 12 months?: 0-No                                        Fall/Risk Scorin          Depression Screening (PHQ-2/PHQ-9): Over the LAST 2 WEEKS   Little interest or pleasure in doing things (over the last two weeks)?: Not at all    F due on 06/03/2020  Update Health Maintenance if applicable   Immunizations      Influenza No orders found for this or any previous visit. Update Immunization Activity if applicable    Pneumococcal No orders found for this or any previous visit.  Update Immu tablet by mouth daily. 90 tablet 4   • Glucose Blood (ED CONTOUR NEXT TEST) In Vitro Strip Use once daily to check blood sugar.  Dx: E11.65 100 each 4   • Blood Glucose Monitoring Suppl (ED CONTOUR NEXT USB MONITOR) W/DEVICE Does not apply Kit 1 Appli lesions  HEENT: atraumatic, normocephalic, ears and throat are clear                Hearing Assessed via:  Whispered Voice  EYES: PERRLA, EOMI, conjunctiva are clear  NECK: supple, no adenopathy, no bruits  CHEST: no chest tenderness  BREAST: no masses or d TABLET BY MOUTH ONCE DAILY    Pt refuses colonoscopy, all risk of not having the procedure including risk of colon cancer d/w the pt. Pt agrees to insure test.       The patient indicates understanding of these issues and agrees to the plan.   The patient i

## 2020-07-17 ENCOUNTER — MED REC SCAN ONLY (OUTPATIENT)
Dept: FAMILY MEDICINE CLINIC | Facility: CLINIC | Age: 70
End: 2020-07-17

## 2020-10-10 ENCOUNTER — MED REC SCAN ONLY (OUTPATIENT)
Dept: FAMILY MEDICINE CLINIC | Facility: CLINIC | Age: 70
End: 2020-10-10

## 2020-10-28 ENCOUNTER — TELEPHONE (OUTPATIENT)
Dept: FAMILY MEDICINE CLINIC | Facility: CLINIC | Age: 70
End: 2020-10-28

## 2020-10-28 DIAGNOSIS — G62.9 NEUROPATHY: ICD-10-CM

## 2020-10-28 NOTE — TELEPHONE ENCOUNTER
Medication(s) to Refill:   Requested Prescriptions     Pending Prescriptions Disp Refills   • pregabalin 50 MG Oral Cap [Pharmacy Med Name: Pregabalin 50 MG Oral Capsule] 90 capsule 0     Sig: TAKE 1 CAPSULE BY MOUTH TWICE DAILY FOR  THE  FIRST  WEEK  AND

## 2020-10-29 RX ORDER — PREGABALIN 50 MG/1
CAPSULE ORAL
Qty: 90 CAPSULE | Refills: 0 | Status: SHIPPED | OUTPATIENT
Start: 2020-10-29 | End: 2020-11-02

## 2020-10-30 DIAGNOSIS — G62.9 NEUROPATHY: ICD-10-CM

## 2020-10-30 RX ORDER — PREGABALIN 50 MG/1
CAPSULE ORAL
Qty: 90 CAPSULE | Refills: 0 | OUTPATIENT
Start: 2020-10-30

## 2020-10-30 NOTE — TELEPHONE ENCOUNTER
Patient's spouse calling to report that pt has only 2 pills left of this med, states that request for fill was made at pharmacy on Thursday, please expedite

## 2020-10-30 NOTE — TELEPHONE ENCOUNTER
Pt informed that Rx was sent on 10/29, contact [facility] to see if ready for . All questions answered, pt expresses understanding.        I attempted to contact [facility] to confirm they received Rx, unable to speak with anyone, VM kept repeating & start

## 2020-11-02 DIAGNOSIS — G62.9 NEUROPATHY: ICD-10-CM

## 2020-11-02 RX ORDER — PREGABALIN 50 MG/1
50 CAPSULE ORAL 3 TIMES DAILY
Qty: 90 CAPSULE | Refills: 0 | Status: SHIPPED | OUTPATIENT
Start: 2020-11-02 | End: 2020-11-09

## 2020-11-09 DIAGNOSIS — I10 BENIGN HYPERTENSION: ICD-10-CM

## 2020-11-09 DIAGNOSIS — G62.9 NEUROPATHY: ICD-10-CM

## 2020-11-09 DIAGNOSIS — Z87.39 HISTORY OF GOUT: Primary | ICD-10-CM

## 2020-11-09 RX ORDER — ALLOPURINOL 300 MG/1
TABLET ORAL
Qty: 90 TABLET | Refills: 4 | Status: SHIPPED | OUTPATIENT
Start: 2020-11-09 | End: 2021-07-07

## 2020-11-09 RX ORDER — PREGABALIN 50 MG/1
50 CAPSULE ORAL 3 TIMES DAILY
Qty: 90 CAPSULE | Refills: 6 | Status: SHIPPED | OUTPATIENT
Start: 2020-11-09 | End: 2020-12-03

## 2020-11-09 RX ORDER — PREGABALIN 50 MG/1
50 CAPSULE ORAL 3 TIMES DAILY
Qty: 90 CAPSULE | Refills: 3 | Status: SHIPPED | OUTPATIENT
Start: 2020-11-09 | End: 2020-11-09

## 2020-11-09 RX ORDER — AMLODIPINE AND VALSARTAN 5; 320 MG/1; MG/1
1 TABLET ORAL DAILY
Qty: 90 TABLET | Refills: 4 | Status: SHIPPED | OUTPATIENT
Start: 2020-11-09 | End: 2021-07-07

## 2020-12-03 DIAGNOSIS — G62.9 NEUROPATHY: ICD-10-CM

## 2020-12-03 RX ORDER — PREGABALIN 50 MG/1
50 CAPSULE ORAL 3 TIMES DAILY
Qty: 90 CAPSULE | Refills: 5 | Status: SHIPPED | OUTPATIENT
Start: 2020-12-03 | End: 2021-06-01

## 2020-12-03 NOTE — TELEPHONE ENCOUNTER
Patients wife called states they are in [place] until end of April. [facility] in [place] will not transfer pregabalin because it is a controlled substance. Requesting prescription and refills to be sent to [facility] in [place]. Pt needs asap for neuropathy.

## 2021-02-03 DIAGNOSIS — Z23 NEED FOR VACCINATION: ICD-10-CM

## 2021-04-06 ENCOUNTER — TELEPHONE (OUTPATIENT)
Dept: FAMILY MEDICINE CLINIC | Facility: CLINIC | Age: 71
End: 2021-04-06

## 2021-04-06 NOTE — TELEPHONE ENCOUNTER
Patient provided a copy of his Covid vaccine record, Stanford Garza on 2/20/21 and 3/25/21 at [facility]. Please remove the order from his chart, thank you.

## 2021-06-01 DIAGNOSIS — G62.9 NEUROPATHY: ICD-10-CM

## 2021-06-01 RX ORDER — PREGABALIN 50 MG/1
CAPSULE ORAL
Qty: 90 CAPSULE | Refills: 0 | Status: SHIPPED | OUTPATIENT
Start: 2021-06-01 | End: 2021-07-06

## 2021-07-03 DIAGNOSIS — G62.9 NEUROPATHY: ICD-10-CM

## 2021-07-06 RX ORDER — PREGABALIN 50 MG/1
CAPSULE ORAL
Qty: 90 CAPSULE | Refills: 0 | Status: SHIPPED | OUTPATIENT
Start: 2021-07-06 | End: 2021-07-07

## 2021-07-07 ENCOUNTER — TELEPHONE (OUTPATIENT)
Dept: FAMILY MEDICINE CLINIC | Facility: CLINIC | Age: 71
End: 2021-07-07

## 2021-07-07 ENCOUNTER — LAB ENCOUNTER (OUTPATIENT)
Dept: LAB | Age: 71
End: 2021-07-07
Attending: FAMILY MEDICINE
Payer: MEDICARE

## 2021-07-07 ENCOUNTER — OFFICE VISIT (OUTPATIENT)
Dept: FAMILY MEDICINE CLINIC | Facility: CLINIC | Age: 71
End: 2021-07-07
Payer: MEDICARE

## 2021-07-07 VITALS
BODY MASS INDEX: 36.73 KG/M2 | OXYGEN SATURATION: 96 % | RESPIRATION RATE: 18 BRPM | TEMPERATURE: 98 F | DIASTOLIC BLOOD PRESSURE: 79 MMHG | HEIGHT: 67 IN | SYSTOLIC BLOOD PRESSURE: 139 MMHG | HEART RATE: 81 BPM | WEIGHT: 234 LBS

## 2021-07-07 DIAGNOSIS — G62.9 NEUROPATHY: ICD-10-CM

## 2021-07-07 DIAGNOSIS — D72.829 LEUKOCYTOSIS, UNSPECIFIED TYPE: ICD-10-CM

## 2021-07-07 DIAGNOSIS — Z87.39 HISTORY OF GOUT: ICD-10-CM

## 2021-07-07 DIAGNOSIS — Z12.5 SCREENING FOR PROSTATE CANCER: ICD-10-CM

## 2021-07-07 DIAGNOSIS — H60.392 OTHER INFECTIVE CHRONIC OTITIS EXTERNA OF LEFT EAR: ICD-10-CM

## 2021-07-07 DIAGNOSIS — Z00.00 MEDICARE ANNUAL WELLNESS VISIT, SUBSEQUENT: Primary | ICD-10-CM

## 2021-07-07 DIAGNOSIS — I10 BENIGN HYPERTENSION: ICD-10-CM

## 2021-07-07 DIAGNOSIS — E78.1 HYPERTRIGLYCERIDEMIA: Primary | ICD-10-CM

## 2021-07-07 DIAGNOSIS — E66.01 SEVERE OBESITY (HCC): ICD-10-CM

## 2021-07-07 DIAGNOSIS — E78.1 HYPERTRIGLYCERIDEMIA: ICD-10-CM

## 2021-07-07 DIAGNOSIS — R73.03 PREDIABETES: ICD-10-CM

## 2021-07-07 LAB
ALBUMIN SERPL-MCNC: 4.5 G/DL (ref 3.4–5)
ALBUMIN/GLOB SERPL: 1.2 {RATIO} (ref 1–2)
ALP LIVER SERPL-CCNC: 81 U/L
ALT SERPL-CCNC: 28 U/L
ANION GAP SERPL CALC-SCNC: 6 MMOL/L (ref 0–18)
AST SERPL-CCNC: 19 U/L (ref 15–37)
BASOPHILS # BLD AUTO: 0.07 X10(3) UL (ref 0–0.2)
BASOPHILS NFR BLD AUTO: 0.6 %
BILIRUB SERPL-MCNC: 0.7 MG/DL (ref 0.1–2)
BUN BLD-MCNC: 22 MG/DL (ref 7–18)
BUN/CREAT SERPL: 18 (ref 10–20)
CALCIUM BLD-MCNC: 9.7 MG/DL (ref 8.5–10.1)
CHLORIDE SERPL-SCNC: 105 MMOL/L (ref 98–112)
CHOLEST SMN-MCNC: 264 MG/DL (ref ?–200)
CO2 SERPL-SCNC: 28 MMOL/L (ref 21–32)
COMPLEXED PSA SERPL-MCNC: 5.26 NG/ML (ref ?–4)
CREAT BLD-MCNC: 1.22 MG/DL
DEPRECATED RDW RBC AUTO: 45.4 FL (ref 35.1–46.3)
EOSINOPHIL # BLD AUTO: 0.19 X10(3) UL (ref 0–0.7)
EOSINOPHIL NFR BLD AUTO: 1.7 %
ERYTHROCYTE [DISTWIDTH] IN BLOOD BY AUTOMATED COUNT: 13.4 % (ref 11–15)
GLOBULIN PLAS-MCNC: 3.7 G/DL (ref 2.8–4.4)
GLUCOSE BLD-MCNC: 113 MG/DL (ref 70–99)
HCT VFR BLD AUTO: 52.8 %
HDLC SERPL-MCNC: 41 MG/DL (ref 40–59)
HGB BLD-MCNC: 16.9 G/DL
IMM GRANULOCYTES # BLD AUTO: 0.12 X10(3) UL (ref 0–1)
IMM GRANULOCYTES NFR BLD: 1.1 %
LDLC SERPL CALC-MCNC: 166 MG/DL (ref ?–100)
LYMPHOCYTES # BLD AUTO: 1.77 X10(3) UL (ref 1–4)
LYMPHOCYTES NFR BLD AUTO: 15.6 %
M PROTEIN MFR SERPL ELPH: 8.2 G/DL (ref 6.4–8.2)
MCH RBC QN AUTO: 29.6 PG (ref 26–34)
MCHC RBC AUTO-ENTMCNC: 32 G/DL (ref 31–37)
MCV RBC AUTO: 92.6 FL
MONOCYTES # BLD AUTO: 0.82 X10(3) UL (ref 0.1–1)
MONOCYTES NFR BLD AUTO: 7.2 %
NEUTROPHILS # BLD AUTO: 8.37 X10 (3) UL (ref 1.5–7.7)
NEUTROPHILS # BLD AUTO: 8.37 X10(3) UL (ref 1.5–7.7)
NEUTROPHILS NFR BLD AUTO: 73.8 %
NONHDLC SERPL-MCNC: 223 MG/DL (ref ?–130)
OSMOLALITY SERPL CALC.SUM OF ELEC: 292 MOSM/KG (ref 275–295)
PATIENT FASTING Y/N/NP: YES
PATIENT FASTING Y/N/NP: YES
PLATELET # BLD AUTO: 310 10(3)UL (ref 150–450)
POTASSIUM SERPL-SCNC: 4.9 MMOL/L (ref 3.5–5.1)
RBC # BLD AUTO: 5.7 X10(6)UL
SODIUM SERPL-SCNC: 139 MMOL/L (ref 136–145)
TRIGL SERPL-MCNC: 301 MG/DL (ref 30–149)
TSI SER-ACNC: 0.87 MIU/ML (ref 0.36–3.74)
VLDLC SERPL CALC-MCNC: 61 MG/DL (ref 0–30)
WBC # BLD AUTO: 11.3 X10(3) UL (ref 4–11)

## 2021-07-07 PROCEDURE — 80061 LIPID PANEL: CPT

## 2021-07-07 PROCEDURE — 99212 OFFICE O/P EST SF 10 MIN: CPT | Performed by: FAMILY MEDICINE

## 2021-07-07 PROCEDURE — G0439 PPPS, SUBSEQ VISIT: HCPCS | Performed by: FAMILY MEDICINE

## 2021-07-07 PROCEDURE — 84443 ASSAY THYROID STIM HORMONE: CPT

## 2021-07-07 PROCEDURE — 80053 COMPREHEN METABOLIC PANEL: CPT

## 2021-07-07 PROCEDURE — 85025 COMPLETE CBC W/AUTO DIFF WBC: CPT

## 2021-07-07 PROCEDURE — 36415 COLL VENOUS BLD VENIPUNCTURE: CPT

## 2021-07-07 RX ORDER — NEOMYCIN SULFATE, POLYMYXIN B SULFATE AND HYDROCORTISONE 10; 3.5; 1 MG/ML; MG/ML; [USP'U]/ML
2 SUSPENSION/ DROPS AURICULAR (OTIC) 4 TIMES DAILY
Qty: 10 ML | Refills: 0 | Status: SHIPPED | OUTPATIENT
Start: 2021-07-07 | End: 2021-07-17

## 2021-07-07 RX ORDER — CIPROFLOXACIN AND DEXAMETHASONE 3; 1 MG/ML; MG/ML
4 SUSPENSION/ DROPS AURICULAR (OTIC) 2 TIMES DAILY
Qty: 7.5 ML | Refills: 1 | Status: SHIPPED | OUTPATIENT
Start: 2021-07-07

## 2021-07-07 RX ORDER — PREGABALIN 50 MG/1
50 CAPSULE ORAL 3 TIMES DAILY
Qty: 270 CAPSULE | Refills: 3 | Status: SHIPPED | OUTPATIENT
Start: 2021-07-07 | End: 2022-02-01

## 2021-07-07 RX ORDER — AMLODIPINE AND VALSARTAN 5; 320 MG/1; MG/1
1 TABLET ORAL DAILY
Qty: 90 TABLET | Refills: 4 | Status: SHIPPED | OUTPATIENT
Start: 2021-07-07

## 2021-07-07 RX ORDER — ALLOPURINOL 300 MG/1
TABLET ORAL
Qty: 90 TABLET | Refills: 4 | Status: SHIPPED | OUTPATIENT
Start: 2021-07-07

## 2021-07-07 NOTE — TELEPHONE ENCOUNTER
----- Message from Marya Oneal MD sent at 7/7/2021  3:49 PM CDT -----  I recommend CT calcium score as indicated in the office visit, low lipid diet, recheck lipids in 3 months. PSA is up we need urologist f/u. Dr. Marcela Essex ok.   CBC: recheck CBC in one

## 2021-07-07 NOTE — TELEPHONE ENCOUNTER
Called patient and spoke with spouse per HIPAA. Went over results and POC below. Spouse states understanding. Patient has previously seen Dr. Roc Rabago and will follow-up with him again.

## 2021-07-07 NOTE — TELEPHONE ENCOUNTER
Ciprodex is not covered by patient's insurance. Formulary alternative are below. Please advise. Thank you!     Note: [employer]'s covered (formulary) drugs are neomycin-polymyxin-hydrocort ear drops,suspension, ofloxacin ear drops AND neomycin-polymyxin-hydr

## 2021-07-07 NOTE — PROGRESS NOTES
Khai Smith is a 69year old male who presents for a Medicare Annual Wellness visit and left ear swelling.     HPI:     Patient Care Team: Patient Care Team:  Jeannie Bolivar MD as PCP - General (Family Practice)  Trey Nuñez MD as Consulting No results found for: TRIGLY  Lab Results   Component Value Date     (H) 07/02/2020     (H) 06/03/2019     06/11/2018     Lab Results   Component Value Date    AST 18 07/02/2020    AST 18 07/08/2019    AST 15 06/03/2019     Lab Res with daily activities? : No    Memory Problems?: No      Fall/Risk Assessment          Have you fallen in the last 12 months?: 0-No                                        Fall/Risk Scorin          Depression Screening (PHQ-2/PHQ-9): Over the LAST 2 WEE Glaucoma Screening      Ophthalmology Visit Annually no    Prostate Cancer Screening      PSA  Every 2 years PSA due on 07/02/2021  Update Health Maintenance if applicable   Immunizations      Influenza No orders found for this or any previous visit.  Upd Besylate-Valsartan 5-320 MG Oral Tab Take 1 tablet by mouth daily.  90 tablet 4   • allopurinol 300 MG Oral Tab TAKE ONE TABLET BY MOUTH ONCE DAILY 90 tablet 4   • ciprofloxacin-dexamethasone 0.3-0.1 % Otic Suspension Place 4 drops into both ears 2 (two) ti 18   Ht 5' 7\" (1.702 m)   Wt 234 lb (106.1 kg)   SpO2 96%   BMI 36.65 kg/m²      > BP Readings from Last 3 Encounters:  07/07/21 : 139/79  07/02/20 : 132/80  11/04/19 : 126/80      GENERAL: well developed, well nourished, in no apparent distress  SKIN: no mouth 3 (three) times daily. Benign hypertension:  HTN- stable  81mg of ASA, low cholesterol and low salt diet and regular exercise encouraged. All side effects vs benefits d/w the pt. Cont. Present meds.   amLODIPine Besylate-Valsartan 5-320 MG Oral

## 2022-01-28 DIAGNOSIS — G62.9 NEUROPATHY: ICD-10-CM

## 2022-01-28 NOTE — TELEPHONE ENCOUNTER
[address] in [place] called, Pt is currently in [place] and needing a refill of pregabalin 50 MG Oral Cap. Please send to [facility] [address], [phone].  Thank you

## 2022-01-28 NOTE — TELEPHONE ENCOUNTER
Requesting refill on pregabalin. LOV 7/7/2021. LF 7/7/2021 for one year. Rx sent to [facility] on 7/7/2021.

## 2022-02-01 RX ORDER — PREGABALIN 50 MG/1
50 CAPSULE ORAL 3 TIMES DAILY
Qty: 270 CAPSULE | Refills: 1 | Status: SHIPPED | OUTPATIENT
Start: 2022-02-01 | End: 2022-07-31

## 2022-02-01 NOTE — TELEPHONE ENCOUNTER
Requesting refill on pregabalin. LOV 7/7/2021. LF 7/7/2021 for one year; however, patient's wife states that thy are unable to fill rx. Please approve or deny pending rx. Thank you.

## 2022-02-01 NOTE — TELEPHONE ENCOUNTER
pts wife calling regarding pregabalin. States the way prescription was sent it  in January even though he should have refills. Please contact pharmacy in [place] or send refills asap. Pt is on his last day of meds.

## 2022-05-09 RX ORDER — PREGABALIN 50 MG/1
CAPSULE ORAL
Qty: 270 CAPSULE | Refills: 0 | Status: SHIPPED | OUTPATIENT
Start: 2022-05-09

## 2022-07-18 ENCOUNTER — OFFICE VISIT (OUTPATIENT)
Dept: FAMILY MEDICINE CLINIC | Facility: CLINIC | Age: 72
End: 2022-07-18
Payer: MEDICARE

## 2022-07-18 ENCOUNTER — LAB ENCOUNTER (OUTPATIENT)
Dept: LAB | Age: 72
End: 2022-07-18
Attending: FAMILY MEDICINE
Payer: MEDICARE

## 2022-07-18 VITALS
BODY MASS INDEX: 37.04 KG/M2 | HEIGHT: 67 IN | WEIGHT: 236 LBS | HEART RATE: 84 BPM | DIASTOLIC BLOOD PRESSURE: 78 MMHG | RESPIRATION RATE: 18 BRPM | SYSTOLIC BLOOD PRESSURE: 130 MMHG | OXYGEN SATURATION: 96 %

## 2022-07-18 DIAGNOSIS — Z87.39 HISTORY OF GOUT: ICD-10-CM

## 2022-07-18 DIAGNOSIS — G62.9 NEUROPATHY: ICD-10-CM

## 2022-07-18 DIAGNOSIS — I10 BENIGN HYPERTENSION: ICD-10-CM

## 2022-07-18 DIAGNOSIS — E55.9 VITAMIN D DEFICIENCY: ICD-10-CM

## 2022-07-18 DIAGNOSIS — E78.1 HYPERTRIGLYCERIDEMIA: ICD-10-CM

## 2022-07-18 DIAGNOSIS — Z12.5 SCREENING FOR PROSTATE CANCER: ICD-10-CM

## 2022-07-18 DIAGNOSIS — Z00.00 MEDICARE ANNUAL WELLNESS VISIT, SUBSEQUENT: Primary | ICD-10-CM

## 2022-07-18 LAB
ALBUMIN SERPL-MCNC: 4.4 G/DL (ref 3.4–5)
ALBUMIN/GLOB SERPL: 1.3 {RATIO} (ref 1–2)
ALP LIVER SERPL-CCNC: 74 U/L
ALT SERPL-CCNC: 23 U/L
ANION GAP SERPL CALC-SCNC: 8 MMOL/L (ref 0–18)
AST SERPL-CCNC: 21 U/L (ref 15–37)
BASOPHILS # BLD AUTO: 0.07 X10(3) UL (ref 0–0.2)
BASOPHILS NFR BLD AUTO: 0.8 %
BILIRUB SERPL-MCNC: 0.8 MG/DL (ref 0.1–2)
BUN BLD-MCNC: 21 MG/DL (ref 7–18)
CALCIUM BLD-MCNC: 10.1 MG/DL (ref 8.5–10.1)
CHLORIDE SERPL-SCNC: 106 MMOL/L (ref 98–112)
CHOLEST SERPL-MCNC: 207 MG/DL (ref ?–200)
CO2 SERPL-SCNC: 23 MMOL/L (ref 21–32)
COMPLEXED PSA SERPL-MCNC: 5.64 NG/ML (ref ?–4)
CREAT BLD-MCNC: 1.38 MG/DL
EOSINOPHIL # BLD AUTO: 0.13 X10(3) UL (ref 0–0.7)
EOSINOPHIL NFR BLD AUTO: 1.4 %
ERYTHROCYTE [DISTWIDTH] IN BLOOD BY AUTOMATED COUNT: 13.7 %
FASTING PATIENT LIPID ANSWER: YES
FASTING STATUS PATIENT QL REPORTED: YES
GLOBULIN PLAS-MCNC: 3.4 G/DL (ref 2.8–4.4)
GLUCOSE BLD-MCNC: 121 MG/DL (ref 70–99)
HCT VFR BLD AUTO: 52.5 %
HDLC SERPL-MCNC: 35 MG/DL (ref 40–59)
HGB BLD-MCNC: 16.7 G/DL
IMM GRANULOCYTES # BLD AUTO: 0.08 X10(3) UL (ref 0–1)
IMM GRANULOCYTES NFR BLD: 0.9 %
LDLC SERPL CALC-MCNC: 131 MG/DL (ref ?–100)
LYMPHOCYTES # BLD AUTO: 1.44 X10(3) UL (ref 1–4)
LYMPHOCYTES NFR BLD AUTO: 15.8 %
MCH RBC QN AUTO: 30.1 PG (ref 26–34)
MCHC RBC AUTO-ENTMCNC: 31.8 G/DL (ref 31–37)
MCV RBC AUTO: 94.6 FL
MONOCYTES # BLD AUTO: 0.66 X10(3) UL (ref 0.1–1)
MONOCYTES NFR BLD AUTO: 7.2 %
NEUTROPHILS # BLD AUTO: 6.74 X10 (3) UL (ref 1.5–7.7)
NEUTROPHILS # BLD AUTO: 6.74 X10(3) UL (ref 1.5–7.7)
NEUTROPHILS NFR BLD AUTO: 73.9 %
NONHDLC SERPL-MCNC: 172 MG/DL (ref ?–130)
OSMOLALITY SERPL CALC.SUM OF ELEC: 288 MOSM/KG (ref 275–295)
PLATELET # BLD AUTO: 320 10(3)UL (ref 150–450)
POTASSIUM SERPL-SCNC: 4.7 MMOL/L (ref 3.5–5.1)
PROT SERPL-MCNC: 7.8 G/DL (ref 6.4–8.2)
RBC # BLD AUTO: 5.55 X10(6)UL
SODIUM SERPL-SCNC: 137 MMOL/L (ref 136–145)
TRIGL SERPL-MCNC: 226 MG/DL (ref 30–149)
TSI SER-ACNC: 0.86 MIU/ML (ref 0.36–3.74)
VIT D+METAB SERPL-MCNC: 23.1 NG/ML (ref 30–100)
VLDLC SERPL CALC-MCNC: 41 MG/DL (ref 0–30)
WBC # BLD AUTO: 9.1 X10(3) UL (ref 4–11)

## 2022-07-18 PROCEDURE — 84443 ASSAY THYROID STIM HORMONE: CPT

## 2022-07-18 PROCEDURE — 1126F AMNT PAIN NOTED NONE PRSNT: CPT | Performed by: FAMILY MEDICINE

## 2022-07-18 PROCEDURE — G0444 DEPRESSION SCREEN ANNUAL: HCPCS | Performed by: FAMILY MEDICINE

## 2022-07-18 PROCEDURE — 36415 COLL VENOUS BLD VENIPUNCTURE: CPT

## 2022-07-18 PROCEDURE — G0439 PPPS, SUBSEQ VISIT: HCPCS | Performed by: FAMILY MEDICINE

## 2022-07-18 PROCEDURE — 82306 VITAMIN D 25 HYDROXY: CPT

## 2022-07-18 PROCEDURE — 80061 LIPID PANEL: CPT

## 2022-07-18 PROCEDURE — 85025 COMPLETE CBC W/AUTO DIFF WBC: CPT

## 2022-07-18 PROCEDURE — 80053 COMPREHEN METABOLIC PANEL: CPT

## 2022-07-18 RX ORDER — ALLOPURINOL 300 MG/1
TABLET ORAL
Qty: 90 TABLET | Refills: 4 | Status: SHIPPED | OUTPATIENT
Start: 2022-07-18

## 2022-07-18 RX ORDER — AMLODIPINE AND VALSARTAN 5; 320 MG/1; MG/1
1 TABLET ORAL DAILY
Qty: 90 TABLET | Refills: 4 | Status: SHIPPED | OUTPATIENT
Start: 2022-07-18

## 2022-07-18 RX ORDER — PREGABALIN 50 MG/1
50 CAPSULE ORAL 3 TIMES DAILY
Qty: 270 CAPSULE | Refills: 1 | Status: SHIPPED | OUTPATIENT
Start: 2022-07-18

## 2022-07-20 ENCOUNTER — TELEPHONE (OUTPATIENT)
Dept: FAMILY MEDICINE CLINIC | Facility: CLINIC | Age: 72
End: 2022-07-20

## 2022-07-20 DIAGNOSIS — R97.20 ELEVATED PSA: Primary | ICD-10-CM

## 2022-07-20 NOTE — TELEPHONE ENCOUNTER
----- Message from Grace Adams MD sent at 7/19/2022 11:41 AM CDT -----  Vit.  D 2000U a day, lipids look better, PSA still up, ok to see pts urologist.

## 2022-10-12 DIAGNOSIS — Z87.39 HISTORY OF GOUT: ICD-10-CM

## 2022-10-12 RX ORDER — ALLOPURINOL 300 MG/1
TABLET ORAL
Qty: 90 TABLET | Refills: 0 | Status: SHIPPED | OUTPATIENT
Start: 2022-10-12 | End: 2023-04-13

## 2022-10-26 DIAGNOSIS — G62.9 NEUROPATHY: ICD-10-CM

## 2022-10-26 DIAGNOSIS — I10 BENIGN HYPERTENSION: ICD-10-CM

## 2022-10-26 RX ORDER — AMLODIPINE AND VALSARTAN 5; 320 MG/1; MG/1
1 TABLET ORAL DAILY
Qty: 90 TABLET | Refills: 4 | Status: SHIPPED | OUTPATIENT
Start: 2022-10-26

## 2022-10-26 RX ORDER — PREGABALIN 50 MG/1
50 CAPSULE ORAL 3 TIMES DAILY
Qty: 270 CAPSULE | Refills: 1 | Status: SHIPPED | OUTPATIENT
Start: 2022-10-26

## 2023-02-07 DIAGNOSIS — G62.9 NEUROPATHY: ICD-10-CM

## 2023-02-08 RX ORDER — PREGABALIN 50 MG/1
CAPSULE ORAL
Qty: 90 CAPSULE | Refills: 0 | Status: SHIPPED | OUTPATIENT
Start: 2023-02-08

## 2023-03-07 DIAGNOSIS — G62.9 NEUROPATHY: ICD-10-CM

## 2023-03-07 RX ORDER — PREGABALIN 50 MG/1
50 CAPSULE ORAL 3 TIMES DAILY
Qty: 270 CAPSULE | Refills: 0 | Status: SHIPPED | OUTPATIENT
Start: 2023-03-07

## 2023-03-07 RX ORDER — PREGABALIN 50 MG/1
CAPSULE ORAL
Qty: 90 CAPSULE | Refills: 0 | Status: SHIPPED | OUTPATIENT
Start: 2023-03-07

## 2023-04-13 DIAGNOSIS — Z87.39 HISTORY OF GOUT: ICD-10-CM

## 2023-04-13 RX ORDER — ALLOPURINOL 300 MG/1
TABLET ORAL
Qty: 90 TABLET | Refills: 0 | Status: SHIPPED | OUTPATIENT
Start: 2023-04-13

## 2023-06-06 DIAGNOSIS — G62.9 NEUROPATHY: ICD-10-CM

## 2023-06-07 RX ORDER — PREGABALIN 50 MG/1
CAPSULE ORAL
Qty: 270 CAPSULE | Refills: 0 | Status: SHIPPED | OUTPATIENT
Start: 2023-06-07

## 2023-07-03 DIAGNOSIS — Z87.39 HISTORY OF GOUT: ICD-10-CM

## 2023-07-03 RX ORDER — ALLOPURINOL 300 MG/1
TABLET ORAL
Qty: 90 TABLET | Refills: 0 | Status: SHIPPED | OUTPATIENT
Start: 2023-07-03

## 2023-07-31 ENCOUNTER — LAB ENCOUNTER (OUTPATIENT)
Dept: LAB | Age: 73
End: 2023-07-31
Attending: FAMILY MEDICINE
Payer: MEDICARE

## 2023-07-31 ENCOUNTER — OFFICE VISIT (OUTPATIENT)
Dept: FAMILY MEDICINE CLINIC | Facility: CLINIC | Age: 73
End: 2023-07-31
Payer: MEDICARE

## 2023-07-31 VITALS
WEIGHT: 236 LBS | HEART RATE: 78 BPM | RESPIRATION RATE: 18 BRPM | BODY MASS INDEX: 37.04 KG/M2 | SYSTOLIC BLOOD PRESSURE: 122 MMHG | HEIGHT: 67 IN | OXYGEN SATURATION: 100 % | DIASTOLIC BLOOD PRESSURE: 82 MMHG

## 2023-07-31 DIAGNOSIS — Z87.39 HISTORY OF GOUT: ICD-10-CM

## 2023-07-31 DIAGNOSIS — E66.01 SEVERE OBESITY (HCC): ICD-10-CM

## 2023-07-31 DIAGNOSIS — R73.03 PREDIABETES: ICD-10-CM

## 2023-07-31 DIAGNOSIS — G62.9 NEUROPATHY: ICD-10-CM

## 2023-07-31 DIAGNOSIS — E55.9 VITAMIN D DEFICIENCY: ICD-10-CM

## 2023-07-31 DIAGNOSIS — Z12.5 SCREENING FOR PROSTATE CANCER: ICD-10-CM

## 2023-07-31 DIAGNOSIS — E78.1 HYPERTRIGLYCERIDEMIA: Primary | ICD-10-CM

## 2023-07-31 DIAGNOSIS — I10 BENIGN HYPERTENSION: ICD-10-CM

## 2023-07-31 DIAGNOSIS — N40.0 BENIGN PROSTATIC HYPERPLASIA WITHOUT LOWER URINARY TRACT SYMPTOMS: ICD-10-CM

## 2023-07-31 DIAGNOSIS — Z00.00 MEDICARE ANNUAL WELLNESS VISIT, SUBSEQUENT: ICD-10-CM

## 2023-07-31 DIAGNOSIS — E78.1 HYPERTRIGLYCERIDEMIA: ICD-10-CM

## 2023-07-31 LAB
ALBUMIN SERPL-MCNC: 4.3 G/DL (ref 3.4–5)
ALBUMIN/GLOB SERPL: 1.2 {RATIO} (ref 1–2)
ALP LIVER SERPL-CCNC: 79 U/L
ALT SERPL-CCNC: 32 U/L
ANION GAP SERPL CALC-SCNC: 5 MMOL/L (ref 0–18)
AST SERPL-CCNC: 12 U/L (ref 15–37)
BASOPHILS # BLD AUTO: 0.06 X10(3) UL (ref 0–0.2)
BASOPHILS NFR BLD AUTO: 0.7 %
BILIRUB SERPL-MCNC: 0.6 MG/DL (ref 0.1–2)
BUN BLD-MCNC: 22 MG/DL (ref 7–18)
CALCIUM BLD-MCNC: 9.6 MG/DL (ref 8.5–10.1)
CHLORIDE SERPL-SCNC: 107 MMOL/L (ref 98–112)
CHOLEST SERPL-MCNC: 223 MG/DL (ref ?–200)
CO2 SERPL-SCNC: 25 MMOL/L (ref 21–32)
COMPLEXED PSA SERPL-MCNC: 9.51 NG/ML (ref ?–4)
CREAT BLD-MCNC: 1.44 MG/DL
EGFRCR SERPLBLD CKD-EPI 2021: 51 ML/MIN/1.73M2 (ref 60–?)
EOSINOPHIL # BLD AUTO: 0.15 X10(3) UL (ref 0–0.7)
EOSINOPHIL NFR BLD AUTO: 1.8 %
ERYTHROCYTE [DISTWIDTH] IN BLOOD BY AUTOMATED COUNT: 13.7 %
FASTING PATIENT LIPID ANSWER: YES
FASTING STATUS PATIENT QL REPORTED: YES
GLOBULIN PLAS-MCNC: 3.6 G/DL (ref 2.8–4.4)
GLUCOSE BLD-MCNC: 146 MG/DL (ref 70–99)
HCT VFR BLD AUTO: 50.7 %
HDLC SERPL-MCNC: 39 MG/DL (ref 40–59)
HGB BLD-MCNC: 16.9 G/DL
IMM GRANULOCYTES # BLD AUTO: 0.08 X10(3) UL (ref 0–1)
IMM GRANULOCYTES NFR BLD: 1 %
LDLC SERPL CALC-MCNC: 146 MG/DL (ref ?–100)
LYMPHOCYTES # BLD AUTO: 1.39 X10(3) UL (ref 1–4)
LYMPHOCYTES NFR BLD AUTO: 16.8 %
MCH RBC QN AUTO: 30.2 PG (ref 26–34)
MCHC RBC AUTO-ENTMCNC: 33.3 G/DL (ref 31–37)
MCV RBC AUTO: 90.7 FL
MONOCYTES # BLD AUTO: 0.63 X10(3) UL (ref 0.1–1)
MONOCYTES NFR BLD AUTO: 7.6 %
NEUTROPHILS # BLD AUTO: 5.96 X10 (3) UL (ref 1.5–7.7)
NEUTROPHILS # BLD AUTO: 5.96 X10(3) UL (ref 1.5–7.7)
NEUTROPHILS NFR BLD AUTO: 72.1 %
NONHDLC SERPL-MCNC: 184 MG/DL (ref ?–130)
OSMOLALITY SERPL CALC.SUM OF ELEC: 290 MOSM/KG (ref 275–295)
PLATELET # BLD AUTO: 304 10(3)UL (ref 150–450)
POTASSIUM SERPL-SCNC: 4.4 MMOL/L (ref 3.5–5.1)
PROT SERPL-MCNC: 7.9 G/DL (ref 6.4–8.2)
RBC # BLD AUTO: 5.59 X10(6)UL
SODIUM SERPL-SCNC: 137 MMOL/L (ref 136–145)
T4 FREE SERPL-MCNC: 1 NG/DL (ref 0.8–1.7)
TRIGL SERPL-MCNC: 207 MG/DL (ref 30–149)
TSI SER-ACNC: 0.93 MIU/ML (ref 0.36–3.74)
VIT D+METAB SERPL-MCNC: 57.4 NG/ML (ref 30–100)
VLDLC SERPL CALC-MCNC: 39 MG/DL (ref 0–30)
WBC # BLD AUTO: 8.3 X10(3) UL (ref 4–11)

## 2023-07-31 PROCEDURE — 85025 COMPLETE CBC W/AUTO DIFF WBC: CPT

## 2023-07-31 PROCEDURE — 82306 VITAMIN D 25 HYDROXY: CPT

## 2023-07-31 PROCEDURE — 80053 COMPREHEN METABOLIC PANEL: CPT

## 2023-07-31 PROCEDURE — 36415 COLL VENOUS BLD VENIPUNCTURE: CPT

## 2023-07-31 PROCEDURE — 84443 ASSAY THYROID STIM HORMONE: CPT

## 2023-07-31 PROCEDURE — 84439 ASSAY OF FREE THYROXINE: CPT

## 2023-07-31 PROCEDURE — 80061 LIPID PANEL: CPT

## 2023-07-31 PROCEDURE — 1126F AMNT PAIN NOTED NONE PRSNT: CPT | Performed by: FAMILY MEDICINE

## 2023-07-31 PROCEDURE — G0439 PPPS, SUBSEQ VISIT: HCPCS | Performed by: FAMILY MEDICINE

## 2023-07-31 RX ORDER — ALLOPURINOL 300 MG/1
TABLET ORAL
Qty: 90 TABLET | Refills: 4 | Status: SHIPPED | OUTPATIENT
Start: 2023-07-31

## 2023-07-31 RX ORDER — ALLOPURINOL 300 MG/1
TABLET ORAL
Qty: 90 TABLET | Refills: 0 | Status: SHIPPED | OUTPATIENT
Start: 2023-07-31 | End: 2023-07-31

## 2023-07-31 RX ORDER — AMLODIPINE AND VALSARTAN 5; 320 MG/1; MG/1
1 TABLET ORAL DAILY
Qty: 90 TABLET | Refills: 4 | Status: SHIPPED | OUTPATIENT
Start: 2023-07-31

## 2023-08-01 ENCOUNTER — TELEPHONE (OUTPATIENT)
Dept: FAMILY MEDICINE CLINIC | Facility: CLINIC | Age: 73
End: 2023-08-01

## 2023-08-01 DIAGNOSIS — R97.20 ELEVATED PSA: Primary | ICD-10-CM

## 2023-08-01 NOTE — TELEPHONE ENCOUNTER
Called pt, talked with wife who was informed of results and recommendations from provider. Dr. Yocasta Oakley information provided. Wife then verbalized pt will probably not schedule appt with urologist. Stressed importance of f/u with urologist. Wife then again repeated pt will not go. Pt then got on phone, was informed of results and recommendations.  Pt verbalized understanding but did not state if he will f/u with urologist.   Dr. Yocasta Oakley  [address] [place] [address]  [phone]

## 2023-08-01 NOTE — TELEPHONE ENCOUNTER
----- Message from Jerry Ramos MD sent at 8/1/2023 11:52 AM CDT -----  Pts PSA is much higher almost double since last year, ok to see pts urologist soon. Low lipid diet , proper hydration.

## 2023-08-11 LAB — AMB EXT COLOGUARD RESULT: NEGATIVE

## 2023-08-21 ENCOUNTER — TELEPHONE (OUTPATIENT)
Dept: FAMILY MEDICINE CLINIC | Facility: CLINIC | Age: 73
End: 2023-08-21

## 2023-08-21 NOTE — TELEPHONE ENCOUNTER
Joseph report negative. Reviewed by Dr. Christiana Stone. Report sent to scan. Documented in ext results. Will send pt [employer] message.

## 2023-09-05 DIAGNOSIS — G62.9 NEUROPATHY: ICD-10-CM

## 2023-09-05 RX ORDER — PREGABALIN 50 MG/1
CAPSULE ORAL
Qty: 270 CAPSULE | Refills: 0 | OUTPATIENT
Start: 2023-09-05

## 2023-09-06 RX ORDER — PREGABALIN 50 MG/1
50 CAPSULE ORAL 3 TIMES DAILY
Qty: 270 CAPSULE | Refills: 0 | Status: SHIPPED | OUTPATIENT
Start: 2023-09-06

## 2023-10-10 ENCOUNTER — TELEPHONE (OUTPATIENT)
Dept: FAMILY MEDICINE CLINIC | Facility: CLINIC | Age: 73
End: 2023-10-10

## 2023-10-10 NOTE — TELEPHONE ENCOUNTER
Called pt's wife to schedule prolia. Wife wanted Dr. Boles to know that pt saw Dr. Aliza Amor 8/29/2023 and had some testing done and that they still have not heard anything back from the results. They are leaving to go out of town the first week in November and she is getting worried. She just wanted Dr. Boles to know since she referred him.

## 2023-10-11 NOTE — TELEPHONE ENCOUNTER
Phoned: Mera Jordan  [facility] [phone]   Re testing Aug.29th    PSA was resulted and per Elenora Show of Dr Laureen Li, pt has been notified. OV note and results will be faxed to PCP.

## 2023-12-05 DIAGNOSIS — G62.9 NEUROPATHY: ICD-10-CM

## 2023-12-05 RX ORDER — PREGABALIN 50 MG/1
50 CAPSULE ORAL 3 TIMES DAILY
Qty: 270 CAPSULE | Refills: 0 | Status: SHIPPED | OUTPATIENT
Start: 2023-12-05

## 2023-12-05 NOTE — TELEPHONE ENCOUNTER
Marsha Oliva requesting Medication Refill for:  pregabalin 50 MG Oral Cap     LOV: 7/31/2023   Last Refill date: 09/06/23  Pharmacy:   [facility] [address] DIA[facility], [place]      Next Scheduled appointment: none     They are wintering in [place]

## 2023-12-05 NOTE — TELEPHONE ENCOUNTER
Requesting refill on pregabalin. LF 9/6/2023. LOV 7/31/2023. Please approve or deny pending rx. Thank you.

## 2024-03-05 DIAGNOSIS — G62.9 NEUROPATHY: ICD-10-CM

## 2024-03-05 NOTE — TELEPHONE ENCOUNTER
Medication(s) to Refill:   Requested Prescriptions     Pending Prescriptions Disp Refills    PREGABALIN 50 MG Oral Cap [Pharmacy Med Name: Pregabalin 50 MG Oral Capsule] 270 capsule 0     Sig: TAKE 1 CAPSULE BY MOUTH THREE TIMES DAILY         Reason for Medication Refill being sent to Provider / Reason Protocol Failed:  [] 90 day refill has already been granted  [] Blood Pressure out of range  [] Labs Abnormal/over due  [] Medication not previously prescribed by Provider  [x] Non-Protocol Medication  [] Controlled Substance   [] Due for appointment- no future appointment scheduled  [] No Follow up specified      Last Time Medication was Filled:  12/6/2023      Last Office Visit with PCP: 7/31/2023    When Patient was Due Back to the Office:  one year  (from when PCP last addressed condition)    Future Appointments:  No future appointments.      Last Blood Pressures:  BP Readings from Last 2 Encounters:   07/31/23 122/82   07/18/22 130/78       Action taken:  [] Refill approved per protocol  [x] Routing to provider for approval

## 2024-03-06 RX ORDER — PREGABALIN 50 MG/1
50 CAPSULE ORAL 3 TIMES DAILY
Qty: 270 CAPSULE | Refills: 0 | Status: SHIPPED | OUTPATIENT
Start: 2024-03-06

## 2024-06-03 DIAGNOSIS — G62.9 NEUROPATHY: ICD-10-CM

## 2024-06-05 RX ORDER — PREGABALIN 50 MG/1
CAPSULE ORAL
Qty: 270 CAPSULE | Refills: 0 | Status: SHIPPED | OUTPATIENT
Start: 2024-06-05

## 2024-06-06 DIAGNOSIS — I10 BENIGN HYPERTENSION: ICD-10-CM

## 2024-06-06 RX ORDER — AMLODIPINE AND VALSARTAN 5; 320 MG/1; MG/1
1 TABLET ORAL EVERY MORNING
Qty: 90 TABLET | Refills: 0 | Status: SHIPPED | OUTPATIENT
Start: 2024-06-06

## 2024-07-29 ENCOUNTER — OFFICE VISIT (OUTPATIENT)
Dept: FAMILY MEDICINE CLINIC | Facility: CLINIC | Age: 74
End: 2024-07-29
Payer: MEDICARE

## 2024-07-29 ENCOUNTER — LAB ENCOUNTER (OUTPATIENT)
Dept: LAB | Age: 74
End: 2024-07-29
Attending: FAMILY MEDICINE
Payer: MEDICARE

## 2024-07-29 VITALS
DIASTOLIC BLOOD PRESSURE: 82 MMHG | HEART RATE: 87 BPM | SYSTOLIC BLOOD PRESSURE: 138 MMHG | OXYGEN SATURATION: 97 % | HEIGHT: 67 IN | WEIGHT: 238 LBS | BODY MASS INDEX: 37.35 KG/M2

## 2024-07-29 DIAGNOSIS — G62.9 NEUROPATHY: ICD-10-CM

## 2024-07-29 DIAGNOSIS — Z87.39 HISTORY OF GOUT: ICD-10-CM

## 2024-07-29 DIAGNOSIS — Z00.00 MEDICARE ANNUAL WELLNESS VISIT, SUBSEQUENT: Primary | ICD-10-CM

## 2024-07-29 DIAGNOSIS — R73.03 PREDIABETES: ICD-10-CM

## 2024-07-29 DIAGNOSIS — H60.543 ECZEMA OF EXTERNAL EAR, BILATERAL: ICD-10-CM

## 2024-07-29 DIAGNOSIS — I10 BENIGN HYPERTENSION: ICD-10-CM

## 2024-07-29 DIAGNOSIS — Z00.00 MEDICARE ANNUAL WELLNESS VISIT, SUBSEQUENT: ICD-10-CM

## 2024-07-29 DIAGNOSIS — E78.1 HYPERTRIGLYCERIDEMIA: ICD-10-CM

## 2024-07-29 DIAGNOSIS — E55.9 VITAMIN D DEFICIENCY: ICD-10-CM

## 2024-07-29 DIAGNOSIS — E66.01 SEVERE OBESITY (HCC): ICD-10-CM

## 2024-07-29 DIAGNOSIS — Z12.5 SCREENING FOR PROSTATE CANCER: ICD-10-CM

## 2024-07-29 DIAGNOSIS — N40.0 BENIGN PROSTATIC HYPERPLASIA WITHOUT LOWER URINARY TRACT SYMPTOMS: ICD-10-CM

## 2024-07-29 LAB
ALBUMIN SERPL-MCNC: 5.3 G/DL (ref 3.2–4.8)
ALBUMIN/GLOB SERPL: 2 {RATIO} (ref 1–2)
ALP LIVER SERPL-CCNC: 89 U/L
ALT SERPL-CCNC: 34 U/L
ANION GAP SERPL CALC-SCNC: 5 MMOL/L (ref 0–18)
AST SERPL-CCNC: 25 U/L (ref ?–34)
BASOPHILS # BLD AUTO: 0.08 X10(3) UL (ref 0–0.2)
BASOPHILS NFR BLD AUTO: 0.8 %
BILIRUB SERPL-MCNC: 0.7 MG/DL (ref 0.2–1.1)
BUN BLD-MCNC: 16 MG/DL (ref 9–23)
CALCIUM BLD-MCNC: 10.2 MG/DL (ref 8.7–10.4)
CHLORIDE SERPL-SCNC: 102 MMOL/L (ref 98–112)
CHOLEST SERPL-MCNC: 221 MG/DL (ref ?–200)
CO2 SERPL-SCNC: 30 MMOL/L (ref 21–32)
COMPLEXED PSA SERPL-MCNC: 7.85 NG/ML (ref ?–4)
CREAT BLD-MCNC: 1.3 MG/DL
EGFRCR SERPLBLD CKD-EPI 2021: 58 ML/MIN/1.73M2 (ref 60–?)
EOSINOPHIL # BLD AUTO: 0.09 X10(3) UL (ref 0–0.7)
EOSINOPHIL NFR BLD AUTO: 0.9 %
ERYTHROCYTE [DISTWIDTH] IN BLOOD BY AUTOMATED COUNT: 13.9 %
FASTING PATIENT LIPID ANSWER: YES
FASTING STATUS PATIENT QL REPORTED: YES
GLOBULIN PLAS-MCNC: 2.7 G/DL (ref 2–3.5)
GLUCOSE BLD-MCNC: 165 MG/DL (ref 70–99)
HCT VFR BLD AUTO: 54.7 %
HDLC SERPL-MCNC: 38 MG/DL (ref 40–59)
HGB BLD-MCNC: 17.8 G/DL
IMM GRANULOCYTES # BLD AUTO: 0.09 X10(3) UL (ref 0–1)
IMM GRANULOCYTES NFR BLD: 0.9 %
LDLC SERPL CALC-MCNC: 138 MG/DL (ref ?–100)
LYMPHOCYTES # BLD AUTO: 1.54 X10(3) UL (ref 1–4)
LYMPHOCYTES NFR BLD AUTO: 14.7 %
MCH RBC QN AUTO: 29.9 PG (ref 26–34)
MCHC RBC AUTO-ENTMCNC: 32.5 G/DL (ref 31–37)
MCV RBC AUTO: 91.9 FL
MONOCYTES # BLD AUTO: 0.64 X10(3) UL (ref 0.1–1)
MONOCYTES NFR BLD AUTO: 6.1 %
NEUTROPHILS # BLD AUTO: 8.02 X10 (3) UL (ref 1.5–7.7)
NEUTROPHILS # BLD AUTO: 8.02 X10(3) UL (ref 1.5–7.7)
NEUTROPHILS NFR BLD AUTO: 76.6 %
NONHDLC SERPL-MCNC: 183 MG/DL (ref ?–130)
OSMOLALITY SERPL CALC.SUM OF ELEC: 289 MOSM/KG (ref 275–295)
PLATELET # BLD AUTO: 340 10(3)UL (ref 150–450)
POTASSIUM SERPL-SCNC: 5 MMOL/L (ref 3.5–5.1)
PROT SERPL-MCNC: 8 G/DL (ref 5.7–8.2)
RBC # BLD AUTO: 5.95 X10(6)UL
SODIUM SERPL-SCNC: 137 MMOL/L (ref 136–145)
TRIGL SERPL-MCNC: 248 MG/DL (ref 30–149)
TSI SER-ACNC: 0.95 MIU/ML (ref 0.55–4.78)
VIT D+METAB SERPL-MCNC: 73.7 NG/ML (ref 30–100)
VLDLC SERPL CALC-MCNC: 46 MG/DL (ref 0–30)
WBC # BLD AUTO: 10.5 X10(3) UL (ref 4–11)

## 2024-07-29 PROCEDURE — 99212 OFFICE O/P EST SF 10 MIN: CPT | Performed by: FAMILY MEDICINE

## 2024-07-29 PROCEDURE — 82306 VITAMIN D 25 HYDROXY: CPT

## 2024-07-29 PROCEDURE — G0439 PPPS, SUBSEQ VISIT: HCPCS | Performed by: FAMILY MEDICINE

## 2024-07-29 PROCEDURE — 80053 COMPREHEN METABOLIC PANEL: CPT

## 2024-07-29 PROCEDURE — 36415 COLL VENOUS BLD VENIPUNCTURE: CPT

## 2024-07-29 PROCEDURE — 84443 ASSAY THYROID STIM HORMONE: CPT

## 2024-07-29 PROCEDURE — 80061 LIPID PANEL: CPT

## 2024-07-29 PROCEDURE — 85025 COMPLETE CBC W/AUTO DIFF WBC: CPT

## 2024-07-29 RX ORDER — CIPROFLOXACIN AND DEXAMETHASONE 3; 1 MG/ML; MG/ML
SUSPENSION/ DROPS AURICULAR (OTIC)
Qty: 7.5 ML | Refills: 1 | Status: SHIPPED | OUTPATIENT
Start: 2024-07-29 | End: 2024-07-31

## 2024-07-29 RX ORDER — ALLOPURINOL 300 MG/1
TABLET ORAL
Qty: 90 TABLET | Refills: 4 | Status: SHIPPED | OUTPATIENT
Start: 2024-07-29

## 2024-07-29 RX ORDER — PREGABALIN 50 MG/1
50 CAPSULE ORAL 2 TIMES DAILY
Qty: 270 CAPSULE | Refills: 1 | Status: SHIPPED | OUTPATIENT
Start: 2024-07-29 | End: 2024-07-30

## 2024-07-29 RX ORDER — AMLODIPINE AND VALSARTAN 5; 320 MG/1; MG/1
1 TABLET ORAL EVERY MORNING
Qty: 90 TABLET | Refills: 4 | Status: SHIPPED | OUTPATIENT
Start: 2024-07-29

## 2024-07-29 RX ORDER — FLUOCINOLONE ACETONIDE 0.11 MG/ML
OIL AURICULAR (OTIC)
Qty: 1 EACH | Refills: 0 | Status: SHIPPED | OUTPATIENT
Start: 2024-07-29 | End: 2024-07-31

## 2024-07-30 ENCOUNTER — TELEPHONE (OUTPATIENT)
Dept: FAMILY MEDICINE CLINIC | Facility: CLINIC | Age: 74
End: 2024-07-30

## 2024-07-30 DIAGNOSIS — G62.9 NEUROPATHY: ICD-10-CM

## 2024-07-30 DIAGNOSIS — D58.2 ELEVATED HEMOGLOBIN (HCC): ICD-10-CM

## 2024-07-30 DIAGNOSIS — R53.83 OTHER FATIGUE: ICD-10-CM

## 2024-07-30 DIAGNOSIS — H60.543 ECZEMA OF EXTERNAL EAR, BILATERAL: ICD-10-CM

## 2024-07-30 DIAGNOSIS — R97.20 ELEVATED PSA: Primary | ICD-10-CM

## 2024-07-30 RX ORDER — PREGABALIN 50 MG/1
50 CAPSULE ORAL 2 TIMES DAILY
Qty: 270 CAPSULE | Refills: 1 | Status: SHIPPED | OUTPATIENT
Start: 2024-07-30

## 2024-07-30 NOTE — TELEPHONE ENCOUNTER
----- Message from Maryann Badillo sent at 7/30/2024  8:45 AM CDT -----  Pt has elevated but stable PSA, pt has urology f/u.  Hg is high, I would like iron study, ferritin, pt needs baby ASA 81mg daily.Low sugar diet, pt is very close to diabetes.

## 2024-07-30 NOTE — TELEPHONE ENCOUNTER
Patient's wife called and said the medications were sent to the wrong location. She is asking if it can be sent to their local pharmacy [id number].     Ciprofloxacin- dexametasone   Fluocinolone Acetonide   Allopurinol 300mg   amLODIPine Besylate   Pregabalin 50mg

## 2024-07-30 NOTE — PROGRESS NOTES
Malcom Shin is a 73 year old male who presents for a Medicare Annual Wellness visit and ear eczema.    HPI:     Patient Care Team: Patient Care Team:  Maryann Badillo MD as PCP - General (Family Practice)  Oppenheim, Robert A, MD as Consulting Physician (OPHTHALMOLOGY)  Amol Dubon MD (UROLOGY)  Lazaro Sanchez MD (OTOLARYNGOLOGY)    Patient Active Problem List   Diagnosis    Benign hypertension    History of gout    Benign prostatic hyperplasia without lower urinary tract symptoms    Severe obesity (HCC)    Neuropathy    Prediabetes    Hypertriglyceridemia    Medicare annual wellness visit, subsequent     Pt has dryness of the ear canals, itching, last smooths, worsening.    Current Outpatient Medications   Medication Sig Dispense Refill    ciprofloxacin-dexamethasone (CIPRODEX) 0.3-0.1 % Otic Suspension BID to the affected ear two drops. 7.5 mL 1    Fluocinolone Acetonide (DERMOTIC) 0.01 % Otic Oil BID on both ear canals when done with Ciprodex. 1 each 0    allopurinol 300 MG Oral Tab Take 1 tablet by mouth once daily 90 tablet 4    amLODIPine Besylate-Valsartan 5-320 MG Oral Tab Take 1 tablet by mouth every morning. 90 tablet 4    pregabalin 50 MG Oral Cap Take 1 capsule (50 mg total) by mouth 2 (two) times daily. 270 capsule 1     Wt Readings from Last 6 Encounters:   07/29/24 238 lb (108 kg)   07/31/23 236 lb (107 kg)   07/18/22 236 lb (107 kg)   07/07/21 234 lb (106.1 kg)   07/02/20 236 lb (107 kg)   11/04/19 238 lb (108 kg)     Body mass index is 37.28 kg/m².    No results found for: \"GLUCOSE\"  Lab Results   Component Value Date    CHOLEST 221 (H) 07/29/2024    CHOLEST 223 (H) 07/31/2023    CHOLEST 207 (H) 07/18/2022     Lab Results   Component Value Date    HDL 38 (L) 07/29/2024    HDL 39 (L) 07/31/2023    HDL 35 (L) 07/18/2022     No results found for: \"TRIGLY\"  Lab Results   Component Value Date     (H) 07/29/2024     (H) 07/31/2023     (H) 07/18/2022     Lab Results    Component Value Date    AST 25 07/29/2024    AST 12 (L) 07/31/2023    AST 21 07/18/2022     Lab Results   Component Value Date    ALT 34 07/29/2024    ALT 32 07/31/2023    ALT 23 07/18/2022     Lab Results   Component Value Date    TSH 0.955 07/29/2024    TSH 0.929 07/31/2023    TSH 0.865 07/18/2022     Lab Results   Component Value Date    BUN 16 07/29/2024    BUN 22 (H) 07/31/2023    BUN 21 (H) 07/18/2022    CREATSERUM 1.30 07/29/2024    CREATSERUM 1.44 (H) 07/31/2023    CREATSERUM 1.38 (H) 07/18/2022     Lab Results   Component Value Date    PSA 2.69 06/11/2018    PSA 3.050 06/15/2017    PSA 2.310 06/10/2016       General Health     In the past six months, have you lost more than 10 pounds without trying?: 2 - No    Has your appetite been poor?: No    Type of Diet: Balanced    How does the patient maintain a good energy level?: Other    How would you describe your daily physical activity?: Light    How would you describe your current health state?: Fair    How do you maintain positive mental well-being?: Social Interaction;Visiting Family         Have you had any immunizations at another office such as Influenza, Hepatitis B, Tetanus, or Pneumococcal?: Yes     Functional Ability     Bathing or Showering: Able without help    Toileting: Able without help    Dressing: Able without help    Eating: Able without help    Driving: Able without help    Preparing your meals: Able without help    Managing money/bills: Able without help    Taking medications as prescribed: Able without help    Are you able to afford your medications?: Yes    Hearing Problems?: Yes     Functional Status     Hearing Problems?: Yes    Vision Problems? : Yes    Difficulty walking?: No    Difficulty dressing or bathing?: No    Problems with daily activities? : No    Memory Problems?: No      Fall/Risk Assessment           flow sheet                                                   Depression Screening (PHQ-2/PHQ-9): Over the LAST 2 WEEKS         Depression Screening (PHQ-2/PHQ-9): Over the LAST 2 WEEKS   Little interest or pleasure in doing things: Not at all    Feeling down, depressed, or hopeless: Not at all    PHQ-2 SCORE: 0                      Advance Directives     Do you have a healthcare power of ?: No    Do you have a living will?: No     Please go to \"Cognitive Assessment\" under Medicare Assessment section in Charting, test patient and document.  .    Then, refresh your progress note to see your input here.  Cognitive Assessment     What day of the week is this?: Correct    What month is it?: Correct    What year is it?: Correct    Recall \"Ball\": Correct    Recall \"Flag\": Correct    Recall \"Tree\": Correct         PREVENTATIVE SERVICES  INDICATIONS AND SCHEDULE Internal Lab or Procedure External Lab or Procedure   Diabetes Screening      HbgA1C   Annually HgbA1C (%)   Date Value   06/03/2019 6.3 (H)         No data to display                Fasting Blood Sugar (FSB)Annually Glucose (mg/dL)   Date Value   07/29/2024 165 (H)       Cardiovascular Disease Screening     LDL Annually LDL Cholesterol (mg/dL)   Date Value   07/29/2024 138 (H)        EKG - w/ Initial Preventative Physical Exam only, or if medically necessary yes    Colorectal Cancer Screening      Colonoscopy Screen every 10 years Health Maintenance   Topic Date Due    Colorectal Cancer Screening  08/11/2026    Update Health Maintenance if applicable    Flex Sigmoidoscopy Screen every 5 years No results found for this or any previous visit.      No data to display                 Fecal Occult Blood Annually Occult Blood Result (no units)   Date Value   06/17/2016 Negative for Occult Blood   06/17/2016 Negative for Occult Blood   06/17/2016 Negative for Occult Blood         No data to display                Glaucoma Screening      Ophthalmology Visit Annually no    Prostate Cancer Screening      PSA  Every 2 years Health Maintenance   Topic Date Due    PSA  07/29/2025     Update  Health Maintenance if applicable   Immunizations      Influenza No orders found for this or any previous visit. Update Immunization Activity if applicable    Pneumococcal No orders found for this or any previous visit. Update Immunization Activity if applicable    Hepatitis B No orders found for this or any previous visit. Update Immunization Activity if applicable    Tetanus No orders found for this or any previous visit. Update Immunization Activity if applicable    Zoster (Not covered by Medicare Part B) No orders found for this or any previous visit. Update Immunization Activity if applicable        SPECIFIC DISEASE MONITORING Internal Lab or Procedure External Lab or Procedure   Annual Monitoring of Persistent     Medications (ACE/ARB, digoxin, diuretics)    Potassium  Annually Potassium (mmol/L)   Date Value   07/29/2024 5.0         No data to display                Creatinine  Annually Creatinine (mg/dL)   Date Value   07/29/2024 1.30         No data to display                Digoxin Serum Conc  Annually No results found for: \"DIGOXIN\"      No data to display                Diabetes      HgbA1C  Annually HgbA1C (%)   Date Value   06/03/2019 6.3 (H)         No data to display                Creat/alb ratio  Annually      LDL  Annually LDL Cholesterol (mg/dL)   Date Value   07/29/2024 138 (H)         No data to display                 Dilated Eye exam  Annually     5/9/2017     1:30 PM   Data entered on:   Last Dilated Eye Exam 5/9/2017          No data to display                COPD      Spirometry Testing Annually No results found for this or any previous visit.      No data to display                  ALLERGIES:     Allergies   Allergen Reactions    Lisinopril Coughing       CURRENT MEDICATIONS:   Current Outpatient Medications   Medication Sig Dispense Refill    ciprofloxacin-dexamethasone (CIPRODEX) 0.3-0.1 % Otic Suspension BID to the affected ear two drops. 7.5 mL 1    Fluocinolone Acetonide (DERMOTIC)  0.01 % Otic Oil BID on both ear canals when done with Ciprodex. 1 each 0    allopurinol 300 MG Oral Tab Take 1 tablet by mouth once daily 90 tablet 4    amLODIPine Besylate-Valsartan 5-320 MG Oral Tab Take 1 tablet by mouth every morning. 90 tablet 4    pregabalin 50 MG Oral Cap Take 1 capsule (50 mg total) by mouth 2 (two) times daily. 270 capsule 1      MEDICAL INFORMATION:   Past Medical History:    Essential hypertension, malignant    Other and unspecified hyperlipidemia    Type II or unspecified type diabetes mellitus without mention of complication, not stated as uncontrolled    Unspecified essential hypertension      No past surgical history on file.   Family History   Problem Relation Age of Onset    Heart Disorder Father     Other (Other) Father     Other (mi) Father     Diabetes Mother       SOCIAL HISTORY:   Social History     Socioeconomic History    Marital status:    Tobacco Use    Smoking status: Never    Smokeless tobacco: Never   Vaping Use    Vaping status: Never Used   Substance and Sexual Activity    Alcohol use: No    Drug use: No   Other Topics Concern    Caffeine Concern Yes     Comment: 2 cups of coffee daily    Exercise No    Seat Belt Yes     Occ: retired : yes     REVIEW OF SYSTEMS:   GENERAL: feels well otherwise  SKIN: denies any unusual skin lesions  EYES: denies blurred vision or double vision  HEENT: denies nasal congestion, sinus pain or ST  LUNGS: denies shortness of breath with exertion  CARDIOVASCULAR: denies chest pain on exertion  GI: denies abdominal pain, denies heartburn  : 2 per night nocturia, no complaint of urinary incontinence  MUSCULOSKELETAL: denies back pain  NEURO: denies headaches  PSYCHE: denies depression or anxiety  HEMATOLOGIC: denies hx of anemia  ENDOCRINE: denies thyroid history  ALL/ASTHMA: denies hx of allergy or asthma    EXAM:   /82   Pulse 87   Ht 5' 7\" (1.702 m)   Wt 238 lb (108 kg)   SpO2 97%   BMI 37.28 kg/m²      >   BP  Readings from Last 3 Encounters:   07/29/24 138/82   07/31/23 122/82   07/18/22 130/78       GENERAL: well developed, well nourished, in no apparent distress  SKIN: no rashes, no suspicious lesions  HEENT: atraumatic, normocephalic, mouth, throat are clear                Hearing Assessed via: Whispered Voice dry ear canals.  EYES: PERRLA, EOMI, conjunctiva are clear  NECK: supple, no adenopathy, no bruits  CHEST: no chest tenderness  BREAST: no masses or discharge  LUNGS: clear to auscultation  CARDIO: RRR without murmur  GI: good BS's, no masses, HSM or tenderness  : two descended testicles, no masses, no hernia and no penile lesions  RECTAL: good rectal tone, prostate shows no masses  MUSCULOSKELETAL: back is not tender, FROM of the back  EXTREMITIES: no cyanosis, clubbing or edema, Bilateral barefoot skin diabetic exam is normal, visualized feet and the appearance is normal.  Bilateral monofilament/sensation of both feet is normal.  Pulsation pedal pulse exam of both lower legs/feet is normal as well.  NEURO: Oriented times three, cranial nerves are intact, motor and sensory are grossly intact    ASSESSMENT AND PLAN OF OTHER RELEVANT CHRONIC CONDITIONS:   Malcom Shin is a 73 year old male who presents for a Medicare Assessment.     Hypertriglyceridemia:stable, low sugar, low salt and  lipid diet, exercise 3 times a week d/w the pt. Lipids today.    Benign prostatic hyperplasia without lower urinary tract symptoms, screening for prostate cacer stable    -     PSA; Future    Vit. D def: stable, recheck level, proper diet.    Prediabetes:    low sugar, low salt and  lipid diet, exercise 3 times a week d/w the pt.    Neuropathy: stable, doing well.      Benign hypertension:stable  low cholesterol and low salt diet and regular exercise encouraged.  All side effects vs benefits d/w the pt.  Cont. Present meds.  amLODIPine Besylate-Valsartan 5-320 MG Oral Tab 90 tablet 4    Sig: Take 1 tablet by mouth daily.        Severe obesity (BMI 35.0-39.9) ([facility]):    Discussed BMI target. Discussed calorie counting, diet, exercise, and one pound of weight loss per week by decreasing calorie intake by 500 calories per day.Handouts given.  Patient understood above plan and agreed.    History of gout: doing well  -     allopurinol 300 MG Oral Tab; TAKE ONE TABLET BY MOUTH ONCE DAILY  Eczema of external ear, bilateral:   Requested Prescriptions     Signed Prescriptions Disp Refills    ciprofloxacin-dexamethasone (CIPRODEX) 0.3-0.1 % Otic Suspension 7.5 mL 1     Sig: BID to the affected ear two drops.    Fluocinolone Acetonide (DERMOTIC) 0.01 % Otic Oil 1 each 0     Sig: BID on both ear canals when done with Ciprodex.         The patient indicates understanding of these issues and agrees to the plan.  The patient is asked to return in  1 year.    Diet counseling perfomed  Dailiy aspirin Risk/Benefits counseling performed    SUGGESTED VACCINATIONS -pt refuses: Pneumococcal, Zoster, Tetanus     Immunization History   Administered Date(s) Administered    Covid-19 Vaccine Moderna 100 mcg/0.5 ml 02/20/2021, 03/25/2021, 10/28/2021    Covid-19 Vaccine Pfizer Bivalent 30mcg/0.3mL 10/13/2022    FLU VAC High Dose 65 YRS & Older PRSV Free (48353) 10/26/2016, 10/12/2018, 11/04/2019, 10/09/2020, 10/13/2021, 10/13/2022, 10/13/2023    Flublok Quad Influenza Vaccine (40350) 10/17/2019    Fluzone Vaccine Medicare () 11/03/2014    HIGH DOSE FLU 65 YRS AND OLDER PRSV FREE SINGLE D (94208) FLU CLINIC 10/07/2015, 10/26/2016, 10/17/2017, 10/31/2018, 10/09/2020, 10/13/2021, 10/13/2022    Influenza 10/07/2015, 10/17/2017    Moderna Covid-19 Vaccine 50mcg/0.5ml 12yrs+ ([id number]) 10/13/2023    Pneumococcal (Prevnar 13) 10/17/2017    Pneumovax 23 10/12/2018, 10/31/2018

## 2024-07-30 NOTE — TELEPHONE ENCOUNTER
Wife called back and was able to txfr all the RXs back to [facility] in [place] EXCEPT for the pregabalin 50 MG Oral Cap and wife is asking for this one to be sent to the [place] [facility].

## 2024-07-31 RX ORDER — CIPROFLOXACIN AND DEXAMETHASONE 3; 1 MG/ML; MG/ML
SUSPENSION/ DROPS AURICULAR (OTIC)
Qty: 7.5 ML | Refills: 1 | Status: SHIPPED | OUTPATIENT
Start: 2024-07-31

## 2024-07-31 RX ORDER — FLUOCINOLONE ACETONIDE 0.11 MG/ML
OIL AURICULAR (OTIC)
Qty: 1 EACH | Refills: 0 | Status: SHIPPED | OUTPATIENT
Start: 2024-07-31

## 2024-07-31 NOTE — TELEPHONE ENCOUNTER
Called pt and informed of lab results and recommendations from provider  Questions answered and pt verbalized understanding  Wife on phone and requested for ear drops to be sent to pharmacy in [place]-medications sent  Labs ordered   Referral placed

## 2024-08-02 ENCOUNTER — TELEPHONE (OUTPATIENT)
Dept: FAMILY MEDICINE CLINIC | Facility: CLINIC | Age: 74
End: 2024-08-02

## 2024-08-02 DIAGNOSIS — G62.9 NEUROPATHY: Primary | ICD-10-CM

## 2024-08-06 RX ORDER — PREGABALIN 50 MG/1
50 CAPSULE ORAL 3 TIMES DAILY
Qty: 270 CAPSULE | Refills: 1 | Status: SHIPPED | OUTPATIENT
Start: 2024-08-06

## 2024-08-07 ENCOUNTER — PATIENT MESSAGE (OUTPATIENT)
Dept: FAMILY MEDICINE CLINIC | Facility: CLINIC | Age: 74
End: 2024-08-07

## 2024-10-22 NOTE — PROGRESS NOTES
Patient presents with:  Medication Request    Prediabetes. HL, HTN f/u. HPI:  Fransisco Boateng is a 75year old male who presents for recheck of his preddiabetes. Patient’s FBS have been below 100. Last HgbA1c was below 6.5 for last several years .   Pt MG Oral Tab Take two tablets by mouth today, then one tablet daily. Disp: 6 tablet Rfl: 1   Glucose Blood (ED CONTOUR NEXT TEST) In Vitro Strip Use once daily to check blood sugar.  Dx: E11.65 Disp: 100 each Rfl: 4   Blood Glucose Monitoring Suppl (ED Neuro: no focal abnormalities, and reflexes coordination and gait normal and symmetric. Bilateral feet- anormal monofillament exam.  Psych- depression screening negative.         ASSESSMENT AND PLAN:   Kristina Hunt is a 75year old male who presents for (4) walks frequently

## 2024-10-24 ENCOUNTER — TELEPHONE (OUTPATIENT)
Dept: FAMILY MEDICINE CLINIC | Facility: CLINIC | Age: 74
End: 2024-10-24

## 2024-10-25 ENCOUNTER — MED REC SCAN ONLY (OUTPATIENT)
Dept: FAMILY MEDICINE CLINIC | Facility: CLINIC | Age: 74
End: 2024-10-25

## 2024-12-05 DIAGNOSIS — G62.9 NEUROPATHY: ICD-10-CM

## 2024-12-05 RX ORDER — PREGABALIN 50 MG/1
50 CAPSULE ORAL 3 TIMES DAILY
Qty: 270 CAPSULE | Refills: 2 | Status: SHIPPED | OUTPATIENT
Start: 2024-12-05

## 2024-12-05 NOTE — TELEPHONE ENCOUNTER
Received call from pt's wife, Luna. Luna asking for Dr. Badillo to send refill of pregabalin to pharmacy in [place]. They are down in [place] until April and [facility] will not transfer rx because it is a controlled substance. Asking for a few refills since they will be there until April. Patient only has a few tabs left.     Refill pended.

## 2025-06-06 DIAGNOSIS — G62.9 NEUROPATHY: ICD-10-CM

## 2025-06-09 RX ORDER — PREGABALIN 50 MG/1
50 CAPSULE ORAL 3 TIMES DAILY
Qty: 270 CAPSULE | Refills: 1 | Status: SHIPPED | OUTPATIENT
Start: 2025-06-09

## 2025-06-09 NOTE — TELEPHONE ENCOUNTER
Please review.  Protocol Failed.    Pregabalin 50mg Recent fill # [id number] : 3/5/25  Last prescription written: 12/5/2024  Last office visit: 7/29/2024    Future Appointments   Date Time Provider Department Center   8/6/2025  8:40 AM Maryann Badillo MD [facility]         Requested Prescriptions   Pending Prescriptions Disp Refills    PREGABALIN 50 MG Oral Cap [Pharmacy Med Name: Pregabalin 50 MG Oral Capsule] 270 capsule 1     Sig: TAKE 1 CAPSULE BY MOUTH THREE TIMES DAILY       Controlled Substance Medication Failed - 6/9/2025 11:30 AM        Failed - This medication is a controlled substance - forward to provider to refill        Passed - Medication is active on med list       Neurology Medications Passed - 6/9/2025 11:30 AM        Passed - In person appointment or virtual visit in the past 6 mos or appointment in next 3 mos        Passed - Medication is active on med list

## 2025-08-06 ENCOUNTER — LAB ENCOUNTER (OUTPATIENT)
Dept: LAB | Age: 75
End: 2025-08-06
Attending: FAMILY MEDICINE

## 2025-08-06 ENCOUNTER — OFFICE VISIT (OUTPATIENT)
Dept: FAMILY MEDICINE CLINIC | Facility: CLINIC | Age: 75
End: 2025-08-06

## 2025-08-06 VITALS
HEART RATE: 82 BPM | HEIGHT: 67 IN | OXYGEN SATURATION: 97 % | BODY MASS INDEX: 36.73 KG/M2 | DIASTOLIC BLOOD PRESSURE: 80 MMHG | SYSTOLIC BLOOD PRESSURE: 138 MMHG | WEIGHT: 234 LBS

## 2025-08-06 DIAGNOSIS — E78.2 MIXED HYPERLIPIDEMIA: ICD-10-CM

## 2025-08-06 DIAGNOSIS — E55.9 VITAMIN D DEFICIENCY: ICD-10-CM

## 2025-08-06 DIAGNOSIS — Z87.39 HISTORY OF GOUT: ICD-10-CM

## 2025-08-06 DIAGNOSIS — I10 BENIGN HYPERTENSION: ICD-10-CM

## 2025-08-06 DIAGNOSIS — Z23 NEED FOR PNEUMOCOCCAL 20-VALENT CONJUGATE VACCINATION: ICD-10-CM

## 2025-08-06 DIAGNOSIS — E55.9 VITAMIN D DEFICIENCY: Primary | ICD-10-CM

## 2025-08-06 DIAGNOSIS — Z12.5 SCREENING FOR PROSTATE CANCER: ICD-10-CM

## 2025-08-06 DIAGNOSIS — G62.9 NEUROPATHY: ICD-10-CM

## 2025-08-06 LAB
ALBUMIN SERPL-MCNC: 5.1 G/DL (ref 3.2–4.8)
ALBUMIN/GLOB SERPL: 1.9 (ref 1–2)
ALP LIVER SERPL-CCNC: 82 U/L (ref 45–117)
ALT SERPL-CCNC: 27 U/L (ref 10–49)
ANION GAP SERPL CALC-SCNC: 14 MMOL/L (ref 0–18)
AST SERPL-CCNC: 23 U/L (ref ?–34)
BASOPHILS # BLD AUTO: 0.08 X10(3) UL (ref 0–0.2)
BASOPHILS NFR BLD AUTO: 0.8 %
BILIRUB SERPL-MCNC: 0.7 MG/DL (ref 0.2–1.1)
BUN BLD-MCNC: 17 MG/DL (ref 9–23)
CALCIUM BLD-MCNC: 10.1 MG/DL (ref 8.7–10.6)
CHLORIDE SERPL-SCNC: 100 MMOL/L (ref 98–112)
CHOLEST SERPL-MCNC: 221 MG/DL (ref ?–200)
CO2 SERPL-SCNC: 26 MMOL/L (ref 21–32)
CREAT BLD-MCNC: 1.39 MG/DL (ref 0.7–1.3)
EGFRCR SERPLBLD CKD-EPI 2021: 53 ML/MIN/1.73M2 (ref 60–?)
EOSINOPHIL # BLD AUTO: 0.1 X10(3) UL (ref 0–0.7)
EOSINOPHIL NFR BLD AUTO: 1 %
ERYTHROCYTE [DISTWIDTH] IN BLOOD BY AUTOMATED COUNT: 13.3 %
FASTING PATIENT LIPID ANSWER: YES
FASTING STATUS PATIENT QL REPORTED: YES
GLOBULIN PLAS-MCNC: 2.7 G/DL (ref 2–3.5)
GLUCOSE BLD-MCNC: 232 MG/DL (ref 70–99)
HCT VFR BLD AUTO: 53.7 % (ref 39–53)
HDLC SERPL-MCNC: 38 MG/DL (ref 40–59)
HGB BLD-MCNC: 17.4 G/DL (ref 13–17.5)
IMM GRANULOCYTES # BLD AUTO: 0.12 X10(3) UL (ref 0–1)
IMM GRANULOCYTES NFR BLD: 1.2 %
LDLC SERPL CALC-MCNC: 133 MG/DL (ref ?–100)
LYMPHOCYTES # BLD AUTO: 1.46 X10(3) UL (ref 1–4)
LYMPHOCYTES NFR BLD AUTO: 14.2 %
MCH RBC QN AUTO: 30.1 PG (ref 26–34)
MCHC RBC AUTO-ENTMCNC: 32.4 G/DL (ref 31–37)
MCV RBC AUTO: 92.9 FL (ref 80–100)
MONOCYTES # BLD AUTO: 0.66 X10(3) UL (ref 0.1–1)
MONOCYTES NFR BLD AUTO: 6.4 %
NEUTROPHILS # BLD AUTO: 7.85 X10 (3) UL (ref 1.5–7.7)
NEUTROPHILS # BLD AUTO: 7.85 X10(3) UL (ref 1.5–7.7)
NEUTROPHILS NFR BLD AUTO: 76.4 %
NONHDLC SERPL-MCNC: 183 MG/DL (ref ?–130)
OSMOLALITY SERPL CALC.SUM OF ELEC: 299 MOSM/KG (ref 275–295)
PLATELET # BLD AUTO: 339 10(3)UL (ref 150–450)
POTASSIUM SERPL-SCNC: 5.1 MMOL/L (ref 3.5–5.1)
PROT SERPL-MCNC: 7.8 G/DL (ref 5.7–8.2)
RBC # BLD AUTO: 5.78 X10(6)UL (ref 3.8–5.8)
SODIUM SERPL-SCNC: 140 MMOL/L (ref 136–145)
TRIGL SERPL-MCNC: 277 MG/DL (ref 30–149)
TSI SER-ACNC: 0.99 UIU/ML (ref 0.55–4.78)
VIT D+METAB SERPL-MCNC: 104.8 NG/ML (ref 30–100)
VLDLC SERPL CALC-MCNC: 51 MG/DL (ref 0–30)
WBC # BLD AUTO: 10.3 X10(3) UL (ref 4–11)

## 2025-08-06 PROCEDURE — G0009 ADMIN PNEUMOCOCCAL VACCINE: HCPCS | Performed by: FAMILY MEDICINE

## 2025-08-06 PROCEDURE — 36415 COLL VENOUS BLD VENIPUNCTURE: CPT

## 2025-08-06 PROCEDURE — 80061 LIPID PANEL: CPT

## 2025-08-06 PROCEDURE — 80053 COMPREHEN METABOLIC PANEL: CPT

## 2025-08-06 PROCEDURE — 90677 PCV20 VACCINE IM: CPT | Performed by: FAMILY MEDICINE

## 2025-08-06 PROCEDURE — 84443 ASSAY THYROID STIM HORMONE: CPT

## 2025-08-06 PROCEDURE — G0439 PPPS, SUBSEQ VISIT: HCPCS | Performed by: FAMILY MEDICINE

## 2025-08-06 PROCEDURE — 82306 VITAMIN D 25 HYDROXY: CPT

## 2025-08-06 PROCEDURE — 85025 COMPLETE CBC W/AUTO DIFF WBC: CPT

## 2025-08-06 RX ORDER — PREGABALIN 50 MG/1
50 CAPSULE ORAL 3 TIMES DAILY
Qty: 270 CAPSULE | Refills: 1 | Status: SHIPPED | OUTPATIENT
Start: 2025-08-06

## 2025-08-06 RX ORDER — AMLODIPINE AND VALSARTAN 5; 320 MG/1; MG/1
1 TABLET ORAL EVERY MORNING
Qty: 90 TABLET | Refills: 4 | Status: SHIPPED | OUTPATIENT
Start: 2025-08-06

## 2025-08-06 RX ORDER — ALLOPURINOL 300 MG/1
TABLET ORAL
Qty: 90 TABLET | Refills: 4 | Status: SHIPPED | OUTPATIENT
Start: 2025-08-06

## (undated) DIAGNOSIS — G62.9 NEUROPATHY: ICD-10-CM

## (undated) NOTE — MR AVS SNAPSHOT
[place] [address] [facility] [address]  [phone]               Thank you for choosing us for your health care visit with Liza Cerda MD.  We are glad to serve you and happy to provide you with this summary of Well controlled type 2 diabetes mellitus with autonomic neuropathy    Medicare annual wellness visit, subsequent    -  Primary      Instructions and Information about Your Health      Prevention Guidelines, Men Ages 72 and Older  Screening tests and vacci Obesity All men in this age group At routine exams   Prostate cancer All men in this age group, talk to healthcare provider about risks and benefits of digital rectal exam (MAAME) and prostate-specific antigen (PSA) screening1 At routine exams   Syphilis Men Diet and exercise Men who are overweight or obese When diagnosed, and then at routine exams   Fall prevention (exercise, vitamin D supplements) All men in this age group At routine exams   Sexually transmitted infection Men at increased risk for infection pressure reading using a digital monitor. Take readings as often as your healthcare provider instructs. Take each reading at the same time of day. Step 1.  Relax    · Take your blood pressure at the same time every day, such as in the morning or evening, o Date Last Reviewed: 4/27/2016  © 6458-3890 The [address] [place] [address], [address] [place], [place] [address]audencio Mcdonald. All rights reserved. This information is not intended as a substitute for professional medical care.  Always follow your healthcare professional your blood sugar again. If your blood sugar still has not returned to your target range, contact your healthcare provider or seek emergency care.   · Once your blood sugar returns to target range, eat a snack or meal.  Preventing low blood sugar  Things you Being physically active every day can help you manage your blood sugar. That’s because an active lifestyle can improve your body’s ability to use insulin. Daily activity can also help delay or prevent complications of diabetes.  And it’s a great way to reli Resistance exercise (also called strength training), makes muscles stronger. It also helps muscles use insulin better. Ask your healthcare provider whether this type of exercise is right for you.  If it is, your healthcare provider can help you work it in t Date Last Reviewed: 6/1/2016  © 7854-0977 The [address] [place] [address], [address] [place], [place] [address]audenc[employer] Mcdonald. All rights reserved. This information is not intended as a substitute for professional medical care.  Always follow your healthcare professional' to just cut back on foods containing cholesterol. ¨ Eat about 2 servings of fish per week. Most fish contain omega-3 fatty acids. These help lower blood cholesterol. ¨ Eat more whole grains and soluble fiber (such as oat bran).  These lower overall choles Current Medications          This list is accurate as of: 6/15/17  9:42 AM.  Always use your most recent med list.                allopurinol 300 MG Tabs   Take 1 tablet (300 mg total) by mouth daily.    Commonly known as:  Aline Garcia [employer] For medical emergencies, dial 911. Educational Information     Healthy Diet and Regular Exercise  [employer] for making healthy food choices  -   Enjoy your food, but eat less. Fully enjoy your food when eating.    Don’t

## (undated) NOTE — LETTER
08/07/19        León Middleton  [address]      Dear Adam Portillo records indicate that you have outstanding lab work and or testing that was ordered for you and has not yet been completed:  Orders Placed This [address]